# Patient Record
Sex: MALE | Race: WHITE | NOT HISPANIC OR LATINO | Employment: OTHER | URBAN - METROPOLITAN AREA
[De-identification: names, ages, dates, MRNs, and addresses within clinical notes are randomized per-mention and may not be internally consistent; named-entity substitution may affect disease eponyms.]

---

## 2017-02-09 ENCOUNTER — TRANSCRIBE ORDERS (OUTPATIENT)
Dept: LAB | Facility: HOSPITAL | Age: 67
End: 2017-02-09

## 2017-02-09 ENCOUNTER — LAB (OUTPATIENT)
Dept: LAB | Facility: HOSPITAL | Age: 67
End: 2017-02-09

## 2017-02-09 DIAGNOSIS — E05.00 TOXIC DIFFUSE GOITER WITHOUT MENTION OF THYROTOXIC CRISIS OR STORM: ICD-10-CM

## 2017-02-09 DIAGNOSIS — E05.00 TOXIC DIFFUSE GOITER WITHOUT MENTION OF THYROTOXIC CRISIS OR STORM: Primary | ICD-10-CM

## 2017-02-09 LAB
ALBUMIN SERPL-MCNC: 4.4 G/DL (ref 3.2–4.8)
ALBUMIN/GLOB SERPL: 1.4 G/DL (ref 1.5–2.5)
ALP SERPL-CCNC: 104 U/L (ref 25–100)
ALT SERPL W P-5'-P-CCNC: 28 U/L (ref 7–40)
ANION GAP SERPL CALCULATED.3IONS-SCNC: 4 MMOL/L (ref 3–11)
AST SERPL-CCNC: 23 U/L (ref 0–33)
BILIRUB SERPL-MCNC: 0.6 MG/DL (ref 0.3–1.2)
BUN BLD-MCNC: 6 MG/DL (ref 9–23)
BUN/CREAT SERPL: 8.6 (ref 7–25)
CALCIUM SPEC-SCNC: 10.1 MG/DL (ref 8.7–10.4)
CHLORIDE SERPL-SCNC: 99 MMOL/L (ref 99–109)
CO2 SERPL-SCNC: 38 MMOL/L (ref 20–31)
CREAT BLD-MCNC: 0.7 MG/DL (ref 0.6–1.3)
GFR SERPL CREATININE-BSD FRML MDRD: 113 ML/MIN/1.73
GLOBULIN UR ELPH-MCNC: 3.2 GM/DL
GLUCOSE BLD-MCNC: 81 MG/DL (ref 70–100)
POTASSIUM BLD-SCNC: 3.9 MMOL/L (ref 3.5–5.5)
PROT SERPL-MCNC: 7.6 G/DL (ref 5.7–8.2)
SODIUM BLD-SCNC: 141 MMOL/L (ref 132–146)
T3RU NFR SERPL: 31.2 % (ref 23–37)
T4 FREE SERPL-MCNC: 1.14 NG/DL (ref 0.89–1.76)
T4 SERPL-MCNC: 9.1 MCG/DL (ref 4.7–11.4)
TSH SERPL DL<=0.05 MIU/L-ACNC: 1.26 MIU/ML (ref 0.35–5.35)

## 2017-02-09 PROCEDURE — 36415 COLL VENOUS BLD VENIPUNCTURE: CPT | Performed by: INTERNAL MEDICINE

## 2017-02-09 PROCEDURE — 84479 ASSAY OF THYROID (T3 OR T4): CPT | Performed by: INTERNAL MEDICINE

## 2017-02-09 PROCEDURE — 80053 COMPREHEN METABOLIC PANEL: CPT | Performed by: INTERNAL MEDICINE

## 2017-02-09 PROCEDURE — 84481 FREE ASSAY (FT-3): CPT | Performed by: INTERNAL MEDICINE

## 2017-02-09 PROCEDURE — 84439 ASSAY OF FREE THYROXINE: CPT | Performed by: INTERNAL MEDICINE

## 2017-02-09 PROCEDURE — 84443 ASSAY THYROID STIM HORMONE: CPT | Performed by: INTERNAL MEDICINE

## 2017-02-10 LAB — T3FREE SERPL-MCNC: 3.6 PG/ML (ref 2–4.4)

## 2017-04-13 ENCOUNTER — TELEPHONE (OUTPATIENT)
Dept: PAIN MEDICINE | Facility: CLINIC | Age: 67
End: 2017-04-13

## 2017-04-13 NOTE — TELEPHONE ENCOUNTER
Patient underwent bilateral lumbar medial branch rhizotomies L2, L3, L4, L5 on 12/02/2015, and experienced 100% relief of his lower back pain along with remarkable functional improvement. Approximately, two weeks ago, he started experiencing recurrence of his lower back pain, same as before his lumbar RFTC. Pain radiates across his lower back, equal on both sides. Patient denies, pain, numbness or weakness in his lower extremities. Patient would like to repeat lumbar rhizotomies. In addition, he reports complete pain relief of his thoracic spine pain since his thoracic medial branch rhizotomies.   Patient denies other changes in his medical history or any new symptoms. He continues on flexeril and tramadol without significant relief. [facility] report # [id number] appropriate.

## 2017-04-25 ENCOUNTER — OFFICE VISIT (OUTPATIENT)
Dept: PAIN MEDICINE | Facility: CLINIC | Age: 67
End: 2017-04-25

## 2017-04-25 VITALS
DIASTOLIC BLOOD PRESSURE: 70 MMHG | RESPIRATION RATE: 18 BRPM | HEART RATE: 60 BPM | HEIGHT: 67 IN | BODY MASS INDEX: 29.1 KG/M2 | WEIGHT: 185.4 LBS | OXYGEN SATURATION: 96 % | TEMPERATURE: 97.8 F | SYSTOLIC BLOOD PRESSURE: 127 MMHG

## 2017-04-25 DIAGNOSIS — F17.210 CIGARETTE NICOTINE DEPENDENCE WITHOUT COMPLICATION: ICD-10-CM

## 2017-04-25 DIAGNOSIS — M47.814 THORACIC SPONDYLOSIS WITHOUT MYELOPATHY: ICD-10-CM

## 2017-04-25 DIAGNOSIS — Z86.79 HISTORY OF ATRIAL FIBRILLATION: ICD-10-CM

## 2017-04-25 DIAGNOSIS — M48.061 LUMBAR STENOSIS WITHOUT NEUROGENIC CLAUDICATION: ICD-10-CM

## 2017-04-25 DIAGNOSIS — M47.816 SPONDYLOSIS OF LUMBAR REGION WITHOUT MYELOPATHY OR RADICULOPATHY: ICD-10-CM

## 2017-04-25 DIAGNOSIS — M51.36 DDD (DEGENERATIVE DISC DISEASE), LUMBAR: ICD-10-CM

## 2017-04-25 PROBLEM — M51.369 DDD (DEGENERATIVE DISC DISEASE), LUMBAR: Status: ACTIVE | Noted: 2017-04-25

## 2017-04-25 PROCEDURE — 99214 OFFICE O/P EST MOD 30 MIN: CPT | Performed by: ANESTHESIOLOGY

## 2017-04-25 RX ORDER — FLUTICASONE PROPIONATE 50 MCG
SPRAY, SUSPENSION (ML) NASAL
Refills: 0 | COMMUNITY
Start: 2017-04-19

## 2017-04-25 RX ORDER — METHIMAZOLE 10 MG/1
TABLET ORAL
Refills: 6 | COMMUNITY
Start: 2017-04-08

## 2017-04-25 RX ORDER — MAGNESIUM GLUCONATE 27 MG(500)
500 TABLET ORAL DAILY
COMMUNITY
End: 2018-04-26

## 2017-04-25 NOTE — PROGRESS NOTES
"CHIEF COMPLAINT: \"Pain in my lower back.\"    BRIEF HISTORY: Mr. Chirag Abdi is a 67 y.o. male, who returns to the clinic for Evaluation of recurrent lower back pain.  Patient underwent bilateral lumbar medial branch rhizotomies, L2, L3, L4, L5 on December 2, 2015, and experienced complete relief and functional improvement that lasted until one month ago. Pain has progressed in intensity over the past four weeks (same pain as before RFTC). He reports complete pain relief of his thoracic pain since thoracic medial branch rhizotmies.   Current pain level: 5/10  Pain level ranges from 2/10 to 9/10   Patient complains of lower back pain.   Patient complains of constant pain with intermittent exacerbation, described as aching and pressure sensation.   Radiation of pain: does not radiate.  Pain increases with: Pain increases with twisting and bending.   Pain decreases with sitting, lying and heat.   Patient denies  pain, numbness and weakness in the lower extremities. Patient denies  any new bladder or bowel problems.     Review of previous therapies and additional medical records:  Chirag Abdi has already failed the following measures, including:   Conservative measures: oral analgesics, opioids, topical analgesics, massage, physical therapy, ice, heat and chiropractic therapy   Interventional: As referenced under HPI  Surgical: No previous lumbar surgery  Chirag Abdi presents with significant comorbidities including nicotine addiction,  not engaged in treatment., arrhythmia engaged in treatment on Eliquis.  In terms of current analgesics, Chirag Abdi takes: tramadol, Flexeril  I have reviewed her [facility] Report #  [id number] consistent with medication reconciliation.    Diagnostic Studies:  MRI Lumbar Spine w/o Contrast, 07/17/2015: L4-L5, moderate bilateral neuroforaminal stenosis. L5-S1, moderate to severe bilateral neuroforaminal stenosis. No subluxation or acute fractures. No " "abnormal cord signal noted.  X-ray Lumbar 5 views, 06/09/2015: no fracture identified. Mild hypertrophic changes of degenerative disc disease diffusely, alignment is normal.       Review of Systems   Musculoskeletal: Positive for back pain.   All other systems reviewed and are negative.     The following portions of the patient's history were reviewed and updated as appropriate: problem list, past medical history, past surgery history, social history, family history, medications, and allergies     /70  Pulse 60  Temp 97.8 °F (36.6 °C) (Temporal Artery )   Resp 18  Ht 67\" (170.2 cm)  Wt 185 lb 6.4 oz (84.1 kg)  SpO2 96%  BMI 29.04 kg/m2      Physical Exam   Neurologic Exam   General appearance: No acute distress, well appearing and well nourished. Appears healthy, within normal limits of ideal weight, well hydrated and appearance reflects stated age.   Head and Face   Head and face: Normal. Palpation of the face and sinuses: No sinus tenderness.   Eyes   Conjunctiva and lids: No erythema, swelling or discharge. Pupils and irises: Equal, round, reactive to light.   Neck   Neck: Supple, symmetric, trachea midline, no masses.   Pulmonary   Respiratory effort: No increased work of breathing or signs of respiratory distress. Auscultation of lungs: Clear to auscultation.   Cardiovascular   Auscultation of heart: Normal rate and rhythm, normal S1 and S2, no murmurs. Peripheral vascular exam: Normal.   Abdomen   Abdomen: Non-tender, no masses. Bowel sounds were normal. The abdomen was soft and nontender. No masses palpated.   Musculoskeletal   Gait and station: Normal. Gait evaluation demonstrated a normal gait. Thoracic facet joint loading maneuvers are negative. The range of motion of the lumbar spine is limited due to pain. Lumbar facet joint loading maneuvers are positive.  and Gaenslen's tests are negative. The range of motion of the hip joints is full and without pain. Piriformis maneuvers are " negative. Muscle strength/tone: Normal. Motor Strength Findings: normal strength. Motor Tone: normal tone. Involuntary movements: none.   Skin   Skin and subcutaneous tissue: Normal without rashes or lesions.   Neurologic   Cranial nerves: Cranial nerves 2-12 intact.   Cortical function: Normal mental status.   Reflexes: 2+ and symmetric. Deep tendon reflexes: 2+ right biceps, 2+ left biceps, 2+ right patella, 2+ left patella, 2+ right ankle jerk and 2+ left ankle jerk. Superficial/Primitive Reflexes: primitive reflexes were absent. Straight leg raising test is negative. Femoral stretch sign is negative. Sensation: No sensory loss. Sensory exam: intact to light touch, intact pain and temperature sensation, intact vibration sensation and normal proprioception.   Coordination: Normal finger to nose and heel to shin. Coordination: normal balance and negative Romberg's sign.   Psychiatric   Judgment and insight: Normal. Orientation to person, place and time: Normal. Recent and remote memory: Intact. Mood and affect: Normal.      ASSESSMENT:   1. Spondylosis of lumbar region without myelopathy or radiculopathy    2. DDD (degenerative disc disease), lumbar    3. Lumbar stenosis without neurogenic claudication    4. Thoracic spondylosis without myelopathy    5. History of atrial fibrillation    6. Cigarette nicotine dependence without complication      PLAN: I have reviewed all available patient's medical records as well as previous therapies as referenced above under history of present illness. Patient Did exceedingly well after lumbar medial branch rhizotomies performed in December 2015.  Therefore, have proposed the following plan:  1. Patient will be scheduled for one set of diagnostic bilateral lumbar medial branch blocks at L2, L3, L4, L5; for bilateral lumbar facet joints at L3-L4, L4-L5, and L5-S1.  If patient experiences more than 80% pain relief along with significant improvement in the range of motion of the  lumbar spine, then, we will repeat lumbar medial branch rhizotomies.  2. Long-term rehabilitation efforts:  a. Patient has declined a referral to [facility]  b. Patient will start physical therapy after his lumbar RFTC.   c. Continue TENS unit  d. Contrast therapy: Apply ice packs to the affected area for 15-20 minutes, then, heating pads for 15-20 minutes, then, use TENS unit. Repeat every 4-6 hours as necessary  3. Pharmacological measures, as follows;  a. Continue Lidoderm patches  b. Take Tylenol 500 mg four times daily as needed for mild to moderate breakthrough pain  c. Continue Flexeril and tramadol, as currently prescribed  4. The patient has been instructed to contact my office with any questions or difficulties. The patient understands the plan and agrees to proceed accordingly.        Patient Care Team:  René Turcios MD as PCP - General (Family Medicine)  Orlando Munroe MD as Consulting Physician (Pain Medicine)  Pierre Villarreal MD as Consulting Physician (Endocrinology)  Jabari Loo MD as Surgeon (Neurosurgery)     New Medications Ordered This Visit   Medications   • magnesium gluconate (MAGONATE) 500 MG tablet     Sig: Take 500 mg by mouth Daily.   • B Complex Vitamins (VITAMIN B COMPLEX PO)     Sig: Take  by mouth Daily.         No future appointments.      Orlando Munroe MD       EMR Dragon/Transcription disclaimer:  Much of this encounter note is an electronic transcription of spoken language to printed text. Electronic transcription of spoken language may permit erroneous, or at times, nonsensical words or phrases to be inadvertently transcribed. Although I have reviewed the note for such errors, some may still exist.

## 2017-04-26 ENCOUNTER — OUTSIDE FACILITY SERVICE (OUTPATIENT)
Dept: PAIN MEDICINE | Facility: CLINIC | Age: 67
End: 2017-04-26

## 2017-04-26 PROCEDURE — 99152 MOD SED SAME PHYS/QHP 5/>YRS: CPT | Performed by: ANESTHESIOLOGY

## 2017-04-26 PROCEDURE — 64493 INJ PARAVERT F JNT L/S 1 LEV: CPT | Performed by: ANESTHESIOLOGY

## 2017-04-26 PROCEDURE — 64495 INJ PARAVERT F JNT L/S 3 LEV: CPT | Performed by: ANESTHESIOLOGY

## 2017-04-26 PROCEDURE — 64494 INJ PARAVERT F JNT L/S 2 LEV: CPT | Performed by: ANESTHESIOLOGY

## 2017-05-11 ENCOUNTER — LAB (OUTPATIENT)
Dept: LAB | Facility: HOSPITAL | Age: 67
End: 2017-05-11

## 2017-05-11 ENCOUNTER — TRANSCRIBE ORDERS (OUTPATIENT)
Dept: LAB | Facility: HOSPITAL | Age: 67
End: 2017-05-11

## 2017-05-11 DIAGNOSIS — E05.00 TOXIC DIFFUSE GOITER WITHOUT MENTION OF THYROTOXIC CRISIS OR STORM: ICD-10-CM

## 2017-05-11 DIAGNOSIS — E05.00 TOXIC DIFFUSE GOITER WITHOUT MENTION OF THYROTOXIC CRISIS OR STORM: Primary | ICD-10-CM

## 2017-05-11 LAB
T3RU NFR SERPL: 30.4 % (ref 23–37)
T4 FREE SERPL-MCNC: 1.01 NG/DL (ref 0.89–1.76)
T4 SERPL-MCNC: 6.4 MCG/DL (ref 4.7–11.4)
TSH SERPL DL<=0.05 MIU/L-ACNC: 1.43 MIU/ML (ref 0.35–5.35)

## 2017-05-11 PROCEDURE — 84479 ASSAY OF THYROID (T3 OR T4): CPT | Performed by: INTERNAL MEDICINE

## 2017-05-11 PROCEDURE — 84443 ASSAY THYROID STIM HORMONE: CPT | Performed by: INTERNAL MEDICINE

## 2017-05-11 PROCEDURE — 36415 COLL VENOUS BLD VENIPUNCTURE: CPT

## 2017-05-11 PROCEDURE — 84439 ASSAY OF FREE THYROXINE: CPT | Performed by: INTERNAL MEDICINE

## 2017-08-15 ENCOUNTER — TELEPHONE (OUTPATIENT)
Dept: PAIN MEDICINE | Facility: CLINIC | Age: 67
End: 2017-08-15

## 2017-08-17 ENCOUNTER — LAB (OUTPATIENT)
Dept: LAB | Facility: HOSPITAL | Age: 67
End: 2017-08-17

## 2017-08-17 ENCOUNTER — OFFICE VISIT (OUTPATIENT)
Dept: PAIN MEDICINE | Facility: CLINIC | Age: 67
End: 2017-08-17

## 2017-08-17 ENCOUNTER — TRANSCRIBE ORDERS (OUTPATIENT)
Dept: LAB | Facility: HOSPITAL | Age: 67
End: 2017-08-17

## 2017-08-17 VITALS
TEMPERATURE: 97.6 F | BODY MASS INDEX: 28.56 KG/M2 | HEIGHT: 67 IN | WEIGHT: 182 LBS | HEART RATE: 61 BPM | OXYGEN SATURATION: 94 % | SYSTOLIC BLOOD PRESSURE: 120 MMHG | DIASTOLIC BLOOD PRESSURE: 68 MMHG

## 2017-08-17 DIAGNOSIS — M47.814 THORACIC SPONDYLOSIS WITHOUT MYELOPATHY: ICD-10-CM

## 2017-08-17 DIAGNOSIS — M48.061 LUMBAR STENOSIS WITHOUT NEUROGENIC CLAUDICATION: ICD-10-CM

## 2017-08-17 DIAGNOSIS — M47.816 SPONDYLOSIS OF LUMBAR REGION WITHOUT MYELOPATHY OR RADICULOPATHY: ICD-10-CM

## 2017-08-17 DIAGNOSIS — Z86.79 HISTORY OF ATRIAL FIBRILLATION: ICD-10-CM

## 2017-08-17 DIAGNOSIS — M51.36 DDD (DEGENERATIVE DISC DISEASE), LUMBAR: ICD-10-CM

## 2017-08-17 DIAGNOSIS — E05.90 PRETIBIAL MYXEDEMA: ICD-10-CM

## 2017-08-17 DIAGNOSIS — E05.00 TOXIC DIFFUSE GOITER WITHOUT MENTION OF THYROTOXIC CRISIS OR STORM: ICD-10-CM

## 2017-08-17 DIAGNOSIS — F17.210 CIGARETTE NICOTINE DEPENDENCE WITHOUT COMPLICATION: ICD-10-CM

## 2017-08-17 DIAGNOSIS — E05.00 TOXIC DIFFUSE GOITER WITHOUT MENTION OF THYROTOXIC CRISIS OR STORM: Primary | ICD-10-CM

## 2017-08-17 LAB
CA-I SERPL ISE-MCNC: 1.19 MMOL/L (ref 1.12–1.32)
T3RU NFR SERPL: 34.3 % (ref 23–37)
T4 FREE SERPL-MCNC: 1.08 NG/DL (ref 0.89–1.76)
T4 SERPL-MCNC: 7.2 MCG/DL (ref 4.7–11.4)
TSH SERPL DL<=0.05 MIU/L-ACNC: 1.56 MIU/ML (ref 0.35–5.35)

## 2017-08-17 PROCEDURE — 82330 ASSAY OF CALCIUM: CPT | Performed by: INTERNAL MEDICINE

## 2017-08-17 PROCEDURE — 36415 COLL VENOUS BLD VENIPUNCTURE: CPT

## 2017-08-17 PROCEDURE — 99213 OFFICE O/P EST LOW 20 MIN: CPT | Performed by: ANESTHESIOLOGY

## 2017-08-17 PROCEDURE — 84443 ASSAY THYROID STIM HORMONE: CPT | Performed by: INTERNAL MEDICINE

## 2017-08-17 PROCEDURE — 84479 ASSAY OF THYROID (T3 OR T4): CPT | Performed by: INTERNAL MEDICINE

## 2017-08-17 PROCEDURE — 84439 ASSAY OF FREE THYROXINE: CPT | Performed by: INTERNAL MEDICINE

## 2017-08-17 RX ORDER — LIDOCAINE 50 MG/G
1 PATCH TOPICAL EVERY 12 HOURS
Qty: 60 PATCH | Refills: 3 | Status: SHIPPED | OUTPATIENT
Start: 2017-08-17 | End: 2018-04-26

## 2017-08-17 RX ORDER — ACETAMINOPHEN 500 MG
TABLET ORAL
Qty: 120 TABLET | Refills: 6 | Status: SHIPPED | OUTPATIENT
Start: 2017-08-17 | End: 2018-04-26

## 2017-08-17 NOTE — PROGRESS NOTES
"CHIEF COMPLAINT: \"Pain in my lower back. I did great after my diagnostic blocks. I had to cancel my rhizotomy because my wife got sick.\"    BRIEF HISTORY: Mr. Chirag Abdi is a 67 y.o. male, who returns to the clinic for evaluation of his chronic lower back pain and to schedule lumbar medial branch rhizotomies. Patient underwent bilateral lumbar medial branch blocks L2, L3, L4, L5 on 04/26/2017, and experienced 100% relief of his lower back pain along with remarkable functional improvement for three days. He had to cancel his RFTC due to his wife's sickness. He is experiencing same pain as before his lumbar RFTC on 12/02/2015. Patient underwent bilateral lumbar medial branch rhizotomies, L2, L3, L4, L5 on December 2, 2015, and experienced complete relief and functional improvement that lasted until three months ago.   Current pain level: 6/10  Pain level ranges from 2/10 to 9/10   Patient complains of lower back pain.   Patient complains of constant pain with intermittent exacerbation, described as aching and pressure sensation.   Radiation of pain: does not radiate.  Pain increases with: Pain increases with twisting and bending.   Pain decreases with sitting, lying and heat.   Patient denies  pain, numbness and weakness in the lower extremities. Patient denies  any new bladder or bowel problems.     Review of previous therapies and additional medical records:  Chirag Abdi has already failed the following measures, including:   Conservative measures: oral analgesics, opioids, topical analgesics, massage, physical therapy, ice, heat and chiropractic therapy   Interventional: As referenced under HPI  Surgical: No previous lumbar surgery  Chirag Abdi presents with significant comorbidities including nicotine addiction,  not engaged in treatment., arrhythmia engaged in treatment on Eliquis.  In terms of current analgesics, Chirag Abdi takes: tramadol, Flexeril  I have reviewed her " "[facility] Report # [id number] consistent with medication reconciliation.    Diagnostic Studies:  MRI Lumbar Spine w/o Contrast, 07/17/2015: L4-L5, moderate bilateral neuroforaminal stenosis. L5-S1, moderate to severe bilateral neuroforaminal stenosis. No subluxation or acute fractures. No abnormal cord signal noted.  X-ray Lumbar 5 views, 06/09/2015: no fracture identified. Mild hypertrophic changes of degenerative disc disease diffusely, alignment is normal.       Review of Systems   Musculoskeletal: Positive for arthralgias and back pain.   All other systems reviewed and are negative.     The following portions of the patient's history were reviewed and updated as appropriate: problem list, past medical history, past surgery history, social history, family history, medications, and allergies     /68 (BP Location: Right arm, Patient Position: Sitting)  Pulse 61  Temp 97.6 °F (36.4 °C) (Temporal Artery )   Ht 67\" (170.2 cm)  Wt 182 lb (82.6 kg)  SpO2 94%  BMI 28.51 kg/m2      Physical Exam   Neurologic Exam   General appearance: No acute distress, well appearing and well nourished. Appears healthy, within normal limits of ideal weight, well hydrated and appearance reflects stated age.   Head and Face   Head and face: Normal. Palpation of the face and sinuses: No sinus tenderness.   Eyes   Conjunctiva and lids: No erythema, swelling or discharge. Pupils and irises: Equal, round, reactive to light.   Neck   Neck: Supple, symmetric, trachea midline, no masses.   Pulmonary   Respiratory effort: No increased work of breathing or signs of respiratory distress. Auscultation of lungs: Clear to auscultation.   Cardiovascular   Auscultation of heart: Normal rate and rhythm, normal S1 and S2, no murmurs. Peripheral vascular exam: Normal.   Abdomen   Abdomen: Non-tender, no masses. Bowel sounds were normal. The abdomen was soft and nontender. No masses palpated.   Musculoskeletal   Gait and station: Normal. Gait " evaluation demonstrated a normal gait. Thoracic facet joint loading maneuvers are negative. The range of motion of the lumbar spine is limited due to pain. Lumbar facet joint loading maneuvers are positive.  and Gaenslen's tests are negative. The range of motion of the hip joints is full and without pain. Piriformis maneuvers are negative. Muscle strength/tone: Normal. Motor Strength Findings: normal strength. Motor Tone: normal tone. Involuntary movements: none.   Skin   Skin and subcutaneous tissue: Normal without rashes or lesions.   Neurologic   Cranial nerves: Cranial nerves 2-12 intact.   Cortical function: Normal mental status.   Reflexes: 2+ and symmetric. Deep tendon reflexes: 2+ right biceps, 2+ left biceps, 2+ right patella, 2+ left patella, 2+ right ankle jerk and 2+ left ankle jerk. Superficial/Primitive Reflexes: primitive reflexes were absent. Straight leg raising test is negative. Femoral stretch sign is negative. Sensation: No sensory loss. Sensory exam: intact to light touch, intact pain and temperature sensation, intact vibration sensation and normal proprioception.   Coordination: Normal finger to nose and heel to shin. Coordination: normal balance and negative Romberg's sign.   Psychiatric   Judgment and insight: Normal. Orientation to person, place and time: Normal. Recent and remote memory: Intact. Mood and affect: Normal.      ASSESSMENT:   1. Spondylosis of lumbar region without myelopathy or radiculopathy    2. DDD (degenerative disc disease), lumbar    3. Lumbar stenosis without neurogenic claudication    4. Thoracic spondylosis without myelopathy    5. History of atrial fibrillation    6. Cigarette nicotine dependence without complication      PLAN: I have reviewed all available patient's medical records as well as previous therapies as referenced above under history of present illness. Patient did exceedingly well after lumbar medial branch rhizotomies performed in December 2015,  and again after one set of diagnostic lumbar medial branch blocks, as referenced under HPI.  Therefore, have proposed the following plan:  1. Patient will be scheduled for bilateral lumbar medial branch rhizotomies at L2, L3, L4, L5; for bilateral lumbar facet joints at L3-L4, L4-L5, and L5-S1.    2. Long-term rehabilitation efforts:  a. Patient has declined a referral to [facility] Cessation [facility]  b. Patient will start physical therapy after his lumbar RFTC.   c. Continue TENS unit  d. Contrast therapy: Apply ice packs to the affected area for 15-20 minutes, then, heating pads for 15-20 minutes, then, use TENS unit. Repeat every 4-6 hours as necessary  3. Pharmacological measures, as follows;  a. Continue Lidoderm patches  b. Take Tylenol 500 mg four times daily as needed for mild to moderate breakthrough pain  c. Continue Flexeril and tramadol, as currently prescribed  4. The patient has been instructed to contact my office with any questions or difficulties. The patient understands the plan and agrees to proceed accordingly.        Patient Care Team:  René Turcios MD as PCP - General (Family Medicine)  Orlando Munroe MD as Consulting Physician (Pain Medicine)  Pierre Villarreal MD as Consulting Physician (Endocrinology)  Jabari Loo MD as Surgeon (Neurosurgery)     No orders of the defined types were placed in this encounter.        Future Appointments  Date Time Provider Department Center   8/17/2017 1:00 PM Orlando Munroe MD [facility] None         Orlando Munroe MD       EMR Dragon/Transcription disclaimer:  Much of this encounter note is an electronic transcription of spoken language to printed text. Electronic transcription of spoken language may permit erroneous, or at times, nonsensical words or phrases to be inadvertently transcribed. Although I have reviewed the note for such errors, some may still exist.

## 2017-09-06 ENCOUNTER — OUTSIDE FACILITY SERVICE (OUTPATIENT)
Dept: PAIN MEDICINE | Facility: CLINIC | Age: 67
End: 2017-09-06

## 2017-09-06 PROCEDURE — 64635 DESTROY LUMB/SAC FACET JNT: CPT | Performed by: ANESTHESIOLOGY

## 2017-09-06 PROCEDURE — 99152 MOD SED SAME PHYS/QHP 5/>YRS: CPT | Performed by: ANESTHESIOLOGY

## 2017-09-06 PROCEDURE — 64636 DESTROY L/S FACET JNT ADDL: CPT | Performed by: ANESTHESIOLOGY

## 2018-04-23 ENCOUNTER — TELEPHONE (OUTPATIENT)
Dept: PAIN MEDICINE | Facility: CLINIC | Age: 68
End: 2018-04-23

## 2018-04-25 NOTE — PROGRESS NOTES
"CHIEF COMPLAINT: \"Pain in my thoracic spine.\"    BRIEF HISTORY: Mr. Chirag Abdi is a 68 y.o. male, who returns to the clinic for evaluation of recurrent thoracic spine pain. Patient underwent bilateral thoracic medial branch rhizotomies T8, T9, T10 for bilateral thoracic facet joints at T9-T10, T10-T11 on 06/15/2016. Patient reports that he experienced complete pain relief and functional improvement that lasted until this past month. Then, pain progressively recurred.   Patient complains of constant thoracic spine pain with intermittent exacerbation, described as aching and pressure sensation.   Radiation of pain: does not radiate.  Current pain level: 8/10  Pain level ranges from 5/10 to 9/10   Pain increases by trying to stand up straight.   Pain decreases with sitting, lying down and heat.   Patient denies pain, numbness or weakness in the lower extremities.   Patient denies any new bladder or bowel problems  Patient has used heat, Flexeril and TENS unit with small amount of relief.     Review of previous therapies and additional medical records:  Chirag Abdi has already failed the following measures, including:   Conservative measures: oral analgesics, opioids, topical analgesics, massage, physical therapy, ice, heat and chiropractic therapy   Interventional:   Bilateral lumbar medial branch rhizotomies: RFTC on 12/02/2015, and 09/06/2017. Patient experienced complete ongoing pain relief and functional improvement. And as referenced under HPI.  Surgical: No history of lumbar surgery  Chirag Abdi presents with significant comorbidities including nicotine addiction, not engaged in treatment, arrhythmia engaged in treatment on Eliquis.  In terms of current analgesics, Chirag Abdi takes: Flexeril  I have reviewed her [facility] Report #[id number] consistent with medication reconciliation.    Diagnostic Studies:  MRI Lumbar Spine w/o Contrast, 07/17/2015: L4-L5, moderate bilateral " "neuroforaminal stenosis. L5-S1, moderate to severe bilateral neuroforaminal stenosis. No subluxation or acute fractures. No abnormal cord signal noted.  X-ray Lumbar 5 views, 06/09/2015: no fracture identified. Mild hypertrophic changes of degenerative disc disease diffusely, alignment is normal.       Review of Systems   HENT: Positive for postnasal drip.    Musculoskeletal: Positive for arthralgias, back pain and neck pain.   All other systems reviewed and are negative.     The following portions of the patient's history were reviewed and updated as appropriate: problem list, past medical history, past surgery history, social history, family history, medications, and allergies     /70 (BP Location: Right arm)   Pulse 72   Temp 98.1 °F (36.7 °C) (Temporal Artery )   Resp 18   Ht 170.2 cm (67\")   Wt 86.8 kg (191 lb 6.4 oz)   SpO2 97%   BMI 29.98 kg/m²       Physical Exam   Neurologic Exam   General appearance: No acute distress, well appearing and well nourished. Appears healthy, within normal limits of ideal weight, well hydrated and appearance reflects stated age.   Head and Face   Head and face: Normal. Palpation of the face and sinuses: No sinus tenderness.   Eyes   Conjunctiva and lids: No erythema, swelling or discharge. Pupils and irises: Equal, round, reactive to light.   Neck   Neck: Supple, symmetric, trachea midline, no masses.   Pulmonary   Respiratory effort: No increased work of breathing or signs of respiratory distress. Auscultation of lungs: Clear to auscultation.   Cardiovascular   Auscultation of heart: Normal rate and rhythm, normal S1 and S2, no murmurs. Peripheral vascular exam: Normal.   Abdomen   Abdomen: Non-tender, no masses. Bowel sounds were normal. The abdomen was soft and nontender. No masses palpated.   Musculoskeletal   Gait and station: Normal. Gait evaluation demonstrated a normal gait. Thoracic facet joint loading maneuvers are positive. The range of motion of the " lumbar spine is improved and without pain. Lumbar facet joint loading maneuvers are negative.  and Gaenslen's tests are negative. The range of motion of the hip joints is full and without pain. Piriformis maneuvers are negative. Muscle strength/tone: Normal. Motor Strength Findings: normal strength. Motor Tone: normal tone. Involuntary movements: none.   Skin   Skin and subcutaneous tissue: Normal without rashes or lesions.   Neurologic   Cranial nerves: Cranial nerves 2-12 intact.   Cortical function: Normal mental status.   Reflexes: 2+ and symmetric. Deep tendon reflexes: 2+ right biceps, 2+ left biceps, 2+ right patella, 2+ left patella, 2+ right ankle jerk and 2+ left ankle jerk. Superficial/Primitive Reflexes: primitive reflexes were absent. Straight leg raising test is negative. Femoral stretch sign is negative. Sensation: No sensory loss. Sensory exam: intact to light touch, intact pain and temperature sensation, intact vibration sensation and normal proprioception.   Coordination: Normal finger to nose and heel to shin. Coordination: normal balance and negative Romberg's sign.   Psychiatric   Judgment and insight: Normal. Orientation to person, place and time: Normal. Recent and remote memory: Intact. Mood and affect: Normal.      ASSESSMENT:   1. Thoracic spondylosis without myelopathy    2. Spondylosis of lumbar region without myelopathy or radiculopathy    3. DDD (degenerative disc disease), lumbar    4. Lumbar stenosis without neurogenic claudication    5. History of atrial fibrillation    6. Cigarette nicotine dependence without complication       PLAN: I have reviewed all available patient's medical records as well as previous therapies as referenced above under history of present illness. Patient did exceedingly well after thoracic and lumbar medial branch rhizotomies, as referenced under HPI.  Therefore, have proposed the following plan:  1. Patient will be scheduled for one set of diagnostic  bilateral thoracic medial branch blocks at T8, T9, T10 for bilateral thoracic facet joints at T9-T10, T10-T11.If patient experiences more than 80% relief and functional improvement, then, will repeat thoracic medial branch rhizotomies   2. Long-term rehabilitation efforts:  a. Patient has declined counseling for smoking cessation   b. Patient will start physical therapy after his RFTC.   c. Continue TENS unit  d. Contrast therapy: Apply ice packs to the affected area for 15-20 minutes, then, heating pads for 15-20 minutes, then, use TENS unit. Repeat every 4-6 hours as necessary  3. Pharmacological measures, as follows;  a. Continue Lidoderm patches  b. Take Tylenol 500 mg four times daily as needed for mild to moderate breakthrough pain  c. Continue Flexeril, as currently prescribed  4. The patient has been instructed to contact my office with any questions or difficulties. The patient understands the plan and agrees to proceed accordingly.      Patient Care Team:  René Turcios MD as PCP - General (Family Medicine)  Orlando Munroe MD as Consulting Physician (Pain Medicine)  Pierre Villarreal MD as Consulting Physician (Endocrinology)  Jabari Loo MD as Consulting Physician (Neurosurgery)     No orders of the defined types were placed in this encounter.        No future appointments.      Orlando Munroe MD       EMR Dragon/Transcription disclaimer:  Much of this encounter note is an electronic transcription of spoken language to printed text. Electronic transcription of spoken language may permit erroneous, or at times, nonsensical words or phrases to be inadvertently transcribed. Although I have reviewed the note for such errors, some may still exist.

## 2018-04-26 ENCOUNTER — OFFICE VISIT (OUTPATIENT)
Dept: PAIN MEDICINE | Facility: CLINIC | Age: 68
End: 2018-04-26

## 2018-04-26 VITALS
BODY MASS INDEX: 30.04 KG/M2 | HEIGHT: 67 IN | DIASTOLIC BLOOD PRESSURE: 70 MMHG | HEART RATE: 72 BPM | RESPIRATION RATE: 18 BRPM | WEIGHT: 191.4 LBS | SYSTOLIC BLOOD PRESSURE: 130 MMHG | TEMPERATURE: 98.1 F | OXYGEN SATURATION: 97 %

## 2018-04-26 DIAGNOSIS — Z86.79 HISTORY OF ATRIAL FIBRILLATION: ICD-10-CM

## 2018-04-26 DIAGNOSIS — M47.814 THORACIC SPONDYLOSIS WITHOUT MYELOPATHY: ICD-10-CM

## 2018-04-26 DIAGNOSIS — M48.061 LUMBAR STENOSIS WITHOUT NEUROGENIC CLAUDICATION: ICD-10-CM

## 2018-04-26 DIAGNOSIS — M47.816 SPONDYLOSIS OF LUMBAR REGION WITHOUT MYELOPATHY OR RADICULOPATHY: ICD-10-CM

## 2018-04-26 DIAGNOSIS — F17.210 CIGARETTE NICOTINE DEPENDENCE WITHOUT COMPLICATION: ICD-10-CM

## 2018-04-26 DIAGNOSIS — M51.36 DDD (DEGENERATIVE DISC DISEASE), LUMBAR: ICD-10-CM

## 2018-04-26 PROCEDURE — 99213 OFFICE O/P EST LOW 20 MIN: CPT | Performed by: ANESTHESIOLOGY

## 2018-05-09 ENCOUNTER — OUTSIDE FACILITY SERVICE (OUTPATIENT)
Dept: PAIN MEDICINE | Facility: CLINIC | Age: 68
End: 2018-05-09

## 2018-05-09 PROCEDURE — 64491 INJ PARAVERT F JNT C/T 2 LEV: CPT | Performed by: ANESTHESIOLOGY

## 2018-05-09 PROCEDURE — 64490 INJ PARAVERT F JNT C/T 1 LEV: CPT | Performed by: ANESTHESIOLOGY

## 2018-05-09 PROCEDURE — 99152 MOD SED SAME PHYS/QHP 5/>YRS: CPT | Performed by: ANESTHESIOLOGY

## 2018-05-23 ENCOUNTER — OUTSIDE FACILITY SERVICE (OUTPATIENT)
Dept: PAIN MEDICINE | Facility: CLINIC | Age: 68
End: 2018-05-23

## 2018-05-23 PROCEDURE — 64634 DESTROY C/TH FACET JNT ADDL: CPT | Performed by: ANESTHESIOLOGY

## 2018-05-23 PROCEDURE — 64633 DESTROY CERV/THOR FACET JNT: CPT | Performed by: ANESTHESIOLOGY

## 2018-05-23 PROCEDURE — 99152 MOD SED SAME PHYS/QHP 5/>YRS: CPT | Performed by: ANESTHESIOLOGY

## 2018-07-25 RX ORDER — GABAPENTIN 100 MG/1
CAPSULE ORAL
Qty: 120 CAPSULE | Refills: 1 | Status: SHIPPED | OUTPATIENT
Start: 2018-07-25 | End: 2018-10-15 | Stop reason: SDUPTHER

## 2018-08-06 ENCOUNTER — TELEPHONE (OUTPATIENT)
Dept: PAIN MEDICINE | Facility: CLINIC | Age: 68
End: 2018-08-06

## 2018-08-06 NOTE — TELEPHONE ENCOUNTER
Patient called to report gabapentin was very effective at this time and that he didn't need f/u for now

## 2018-10-10 RX ORDER — GABAPENTIN 100 MG/1
CAPSULE ORAL
Qty: 120 CAPSULE | Refills: 1 | OUTPATIENT
Start: 2018-10-10

## 2018-10-10 NOTE — TELEPHONE ENCOUNTER
Carley scheduled patient appointment     ----- Message from Orlando Munroe MD sent at 10/10/2018 10:06 AM EDT -----  Regarding: RE: Rx  Make him an appt with jama for refill ASAP  ----- Message -----  From: Yoly Jeffries MA  Sent: 10/10/2018   9:11 AM  To: Orlando Munroe MD  Subject: Rx                                               Received refill request for Gabapentin. Patient last seen 5/23/18. Manny report # [id number] scanned to media

## 2018-10-12 NOTE — PROGRESS NOTES
"CHIEF COMPLAINT: \"I need a refill on gabapentin.\"    BRIEF HISTORY: Mr. Chirag Abdi is a 68 y.o. male, who returns to the clinic for medication refill. Patient takes gabapentin 100 mg qid, without adverse effects.  He was last seen on 05/23/2018 for bilateral rhizotomies at T8, T9, and T10, and experienced ongoing relief.  He states that he has some soreness in his right hip and buttock after a recent fall.  Patient states that x-rays of hip were normal, and he was diagnosed with \"a deep tissue bruise.\" Overall patient states that he is doing well, and voices no concerns.  Current pain level: 3/10  Pain level ranges from 3/10 to 7/10   Pain increases by trying to stand up straight.   Pain decreases with sitting, lying down and heat.   Patient denies pain, numbness or weakness in the lower extremities.   Patient denies any new bladder or bowel problems  Patient uses heat, tizanidine, and TENS unit.     Review of previous therapies and additional medical records:  Chirag Abdi has already failed the following measures, including:   Conservative measures: oral analgesics, opioids, topical analgesics, massage, physical therapy, ice, heat and chiropractic therapy   Interventional:   Bilateral lumbar medial branch rhizotomies: As referenced above.  RFTC on 12/02/2015, and 09/06/2017.    Surgical: No history of lumbar surgery  Chirag Abdi presents with significant comorbidities including nicotine addiction, not engaged in treatment, arrhythmia engaged in treatment on Eliquis.  In terms of current analgesics, Chirag Abdi takes: tizanidine and gabapentin  I have reviewed her Manny Report #[id number] consistent with medication reconciliation.    Global Pain Scale 10-          Pain 11          Feelings 0          Clinical outcomes 9          Activities 17          GPS Total: 37            Diagnostic Studies: There are no new studies available for review.      Review of Systems " "  Musculoskeletal: Positive for arthralgias, back pain, gait problem and myalgias.   All other systems reviewed and are negative.     The following portions of the patient's history were reviewed and updated as appropriate: problem list, past medical history, past surgery history, social history, family history, medications, and allergies     /60   Pulse 64   Temp 97.1 °F (36.2 °C) (Temporal Artery )   Resp 18   Ht 170.2 cm (67\")   Wt 89.9 kg (198 lb 3.2 oz)   SpO2 94%   BMI 31.04 kg/m²       Physical Exam   Neurologic Exam   General appearance: No acute distress, well appearing and well nourished. Appears healthy, within normal limits of ideal weight, well hydrated and appearance reflects stated age.   Head and Face   Head and face: Normal.   Eyes   Conjunctiva and lids: No erythema, swelling or discharge. Pupils and irises: Equal, round, reactive to light.   Neck   Neck: Supple, symmetric, trachea midline, no masses.   Pulmonary   Respiratory effort: No increased work of breathing or signs of respiratory distress. Auscultation of lungs: Clear to auscultation.   Cardiovascular   Auscultation of heart: Normal rate and rhythm, normal S1 and S2, no murmurs. Peripheral vascular exam: Normal.   Abdomen   Abdomen: Non-tender, no masses. Bowel sounds were normal. The abdomen was soft and nontender. No masses palpated.   Musculoskeletal   Gait and station: Normal. Gait evaluation demonstrated a normal gait. Thoracic facet joint loading maneuvers are negative. The range of motion of the lumbar spine is almost full and without pain. Lumbar facet joint loading maneuvers are negative.  and Gaenslen's tests are negative. The range of motion of the hip joints is full and without pain. Piriformis maneuvers are negative. Muscle strength/tone: Normal. Motor Strength Findings: normal strength. Motor Tone: normal tone. Involuntary movements: none.   Skin   Skin and subcutaneous tissue: Normal without rashes or " lesions.   Neurologic   Cranial nerves: Cranial nerves 2-12 intact.   Cortical function: Normal mental status.   Reflexes: 2+ and symmetric. Deep tendon reflexes: 2+ right biceps, 2+ left biceps, 2+ right patella, 2+ left patella, 2+ right ankle jerk and 2+ left ankle jerk. Superficial/Primitive Reflexes: primitive reflexes were absent. Straight leg raising test is negative. Femoral stretch sign is negative. Sensation: No sensory loss. Sensory exam: intact to light touch, intact pain and temperature sensation, intact vibration sensation and normal proprioception.   Coordination: Normal finger to nose and heel to shin. Coordination: normal balance and negative Romberg's sign.   Psychiatric   Judgment and insight: Normal. Orientation to person, place and time: Normal. Recent and remote memory: Intact. Mood and affect: Normal.      ASSESSMENT:   1. Thoracic spondylosis without myelopathy    2. Spondylosis of lumbar region without myelopathy or radiculopathy    3. DDD (degenerative disc disease), lumbar    4. Lumbar stenosis without neurogenic claudication       PLAN:   I have reviewed all available patient's medical records as well as previous therapies as referenced above, and discussed the patient with Dr. Munroe.  1. Continue gabapentin 100 mg qid.  Refilled.  RTC in 6 months for medication follow-up.  2. Take Tylenol 500 mg four times daily as needed for mild to moderate breakthrough pain  3. Continue tizanidine, as currently prescribed  4. The patient has been instructed to contact my office with any questions or difficulties. The patient understands the plan and agrees to proceed accordingly.      Patient Care Team:  René Turcios MD as PCP - General (Family Medicine)  Orlando Munroe MD as Consulting Physician (Pain Medicine)  Pierre Villarreal MD as Consulting Physician (Endocrinology)  Jabari Loo MD as Consulting Physician (Neurosurgery)     No orders of the defined types were placed  in this encounter.        Future Appointments  Date Time Provider Department Center   3/5/2019 10:30 AM Benoit Gomez PA-C [facility] None         Benoit Gomez PA-C       EMR Dragon/Transcription disclaimer:  Much of this encounter note is an electronic transcription of spoken language to printed text. Electronic transcription of spoken language may permit erroneous, or at times, nonsensical words or phrases to be inadvertently transcribed. Although I have reviewed the note for such errors, some may still exist.

## 2018-10-15 ENCOUNTER — OFFICE VISIT (OUTPATIENT)
Dept: PAIN MEDICINE | Facility: CLINIC | Age: 68
End: 2018-10-15

## 2018-10-15 VITALS
RESPIRATION RATE: 18 BRPM | HEIGHT: 67 IN | OXYGEN SATURATION: 94 % | TEMPERATURE: 97.1 F | DIASTOLIC BLOOD PRESSURE: 60 MMHG | SYSTOLIC BLOOD PRESSURE: 140 MMHG | HEART RATE: 64 BPM | BODY MASS INDEX: 31.11 KG/M2 | WEIGHT: 198.2 LBS

## 2018-10-15 DIAGNOSIS — M48.061 LUMBAR STENOSIS WITHOUT NEUROGENIC CLAUDICATION: ICD-10-CM

## 2018-10-15 DIAGNOSIS — M47.814 THORACIC SPONDYLOSIS WITHOUT MYELOPATHY: Primary | ICD-10-CM

## 2018-10-15 DIAGNOSIS — M47.816 SPONDYLOSIS OF LUMBAR REGION WITHOUT MYELOPATHY OR RADICULOPATHY: ICD-10-CM

## 2018-10-15 DIAGNOSIS — M51.36 DDD (DEGENERATIVE DISC DISEASE), LUMBAR: ICD-10-CM

## 2018-10-15 PROCEDURE — 99213 OFFICE O/P EST LOW 20 MIN: CPT | Performed by: PHYSICIAN ASSISTANT

## 2018-10-15 RX ORDER — TIZANIDINE HYDROCHLORIDE 2 MG/1
2 CAPSULE, GELATIN COATED ORAL 3 TIMES DAILY
COMMUNITY

## 2018-10-15 RX ORDER — GABAPENTIN 100 MG/1
CAPSULE ORAL
Qty: 120 CAPSULE | Refills: 5 | OUTPATIENT
Start: 2018-10-15 | End: 2019-03-05

## 2019-03-05 ENCOUNTER — OFFICE VISIT (OUTPATIENT)
Dept: PAIN MEDICINE | Facility: CLINIC | Age: 69
End: 2019-03-05

## 2019-03-05 VITALS
DIASTOLIC BLOOD PRESSURE: 68 MMHG | HEIGHT: 67 IN | WEIGHT: 189 LBS | BODY MASS INDEX: 29.66 KG/M2 | SYSTOLIC BLOOD PRESSURE: 138 MMHG | OXYGEN SATURATION: 96 % | HEART RATE: 56 BPM

## 2019-03-05 DIAGNOSIS — M51.36 DDD (DEGENERATIVE DISC DISEASE), LUMBAR: ICD-10-CM

## 2019-03-05 DIAGNOSIS — M47.816 SPONDYLOSIS OF LUMBAR REGION WITHOUT MYELOPATHY OR RADICULOPATHY: ICD-10-CM

## 2019-03-05 DIAGNOSIS — M48.061 LUMBAR STENOSIS WITHOUT NEUROGENIC CLAUDICATION: ICD-10-CM

## 2019-03-05 DIAGNOSIS — M47.814 THORACIC SPONDYLOSIS WITHOUT MYELOPATHY: Primary | ICD-10-CM

## 2019-03-05 PROCEDURE — 99213 OFFICE O/P EST LOW 20 MIN: CPT | Performed by: PHYSICIAN ASSISTANT

## 2019-04-29 RX ORDER — GABAPENTIN 100 MG/1
100 CAPSULE ORAL 4 TIMES DAILY
Qty: 120 CAPSULE | Refills: 5 | OUTPATIENT
Start: 2019-04-29 | End: 2019-07-17 | Stop reason: SDUPTHER

## 2019-04-29 RX ORDER — GABAPENTIN 100 MG/1
CAPSULE ORAL
Qty: 120 CAPSULE | OUTPATIENT
Start: 2019-04-29

## 2019-07-17 RX ORDER — GABAPENTIN 100 MG/1
100 CAPSULE ORAL 4 TIMES DAILY
Qty: 360 CAPSULE | Refills: 1 | OUTPATIENT
Start: 2019-07-17 | End: 2020-03-17

## 2019-11-11 ENCOUNTER — HOSPITAL ENCOUNTER (EMERGENCY)
Facility: HOSPITAL | Age: 69
Discharge: HOME | End: 2019-11-11
Attending: EMERGENCY MEDICINE | Admitting: EMERGENCY MEDICINE

## 2019-11-11 ENCOUNTER — LAB (OUTPATIENT)
Dept: LAB | Facility: HOSPITAL | Age: 69
End: 2019-11-11

## 2019-11-11 ENCOUNTER — APPOINTMENT (OUTPATIENT)
Dept: CARDIOLOGY | Facility: HOSPITAL | Age: 69
End: 2019-11-11

## 2019-11-11 ENCOUNTER — TRANSCRIBE ORDERS (OUTPATIENT)
Dept: LAB | Facility: HOSPITAL | Age: 69
End: 2019-11-11

## 2019-11-11 ENCOUNTER — APPOINTMENT (OUTPATIENT)
Dept: GENERAL RADIOLOGY | Facility: HOSPITAL | Age: 69
End: 2019-11-11

## 2019-11-11 VITALS
BODY MASS INDEX: 25.01 KG/M2 | TEMPERATURE: 97.8 F | RESPIRATION RATE: 18 BRPM | HEART RATE: 54 BPM | SYSTOLIC BLOOD PRESSURE: 132 MMHG | OXYGEN SATURATION: 94 % | WEIGHT: 165 LBS | DIASTOLIC BLOOD PRESSURE: 61 MMHG | HEIGHT: 68 IN

## 2019-11-11 DIAGNOSIS — E05.00 BASEDOW'S DISEASE: Primary | ICD-10-CM

## 2019-11-11 DIAGNOSIS — S80.11XA CONTUSION OF RIGHT CALF, INITIAL ENCOUNTER: Primary | ICD-10-CM

## 2019-11-11 DIAGNOSIS — E05.90 PRETIBIAL MYXEDEMA: ICD-10-CM

## 2019-11-11 DIAGNOSIS — E05.00 BASEDOW'S DISEASE: ICD-10-CM

## 2019-11-11 LAB
ALBUMIN SERPL-MCNC: 4.2 G/DL (ref 3.5–5.2)
ALBUMIN/GLOB SERPL: 1.4 G/DL
ALP SERPL-CCNC: 89 U/L (ref 39–117)
ALT SERPL W P-5'-P-CCNC: 18 U/L (ref 1–41)
ANION GAP SERPL CALCULATED.3IONS-SCNC: 10 MMOL/L (ref 5–15)
AST SERPL-CCNC: 17 U/L (ref 1–40)
BH CV LOWER VASCULAR LEFT COMMON FEMORAL AUGMENT: NORMAL
BH CV LOWER VASCULAR LEFT COMMON FEMORAL COMPRESS: NORMAL
BH CV LOWER VASCULAR LEFT COMMON FEMORAL PHASIC: NORMAL
BH CV LOWER VASCULAR LEFT COMMON FEMORAL SPONT: NORMAL
BH CV LOWER VASCULAR RIGHT COMMON FEMORAL AUGMENT: NORMAL
BH CV LOWER VASCULAR RIGHT COMMON FEMORAL COMPRESS: NORMAL
BH CV LOWER VASCULAR RIGHT COMMON FEMORAL PHASIC: NORMAL
BH CV LOWER VASCULAR RIGHT COMMON FEMORAL SPONT: NORMAL
BH CV LOWER VASCULAR RIGHT DISTAL FEMORAL AUGMENT: NORMAL
BH CV LOWER VASCULAR RIGHT DISTAL FEMORAL COMPRESS: NORMAL
BH CV LOWER VASCULAR RIGHT GASTRONEMIUS COMPRESS: NORMAL
BH CV LOWER VASCULAR RIGHT GREATER SAPH AK COMPRESS: NORMAL
BH CV LOWER VASCULAR RIGHT GREATER SAPH BK COMPRESS: NORMAL
BH CV LOWER VASCULAR RIGHT LESSER SAPH COMPRESS: NORMAL
BH CV LOWER VASCULAR RIGHT MID FEMORAL AUGMENT: NORMAL
BH CV LOWER VASCULAR RIGHT MID FEMORAL COMPRESS: NORMAL
BH CV LOWER VASCULAR RIGHT MID FEMORAL PHASIC: NORMAL
BH CV LOWER VASCULAR RIGHT MID FEMORAL SPONT: NORMAL
BH CV LOWER VASCULAR RIGHT PERONEAL AUGMENT: NORMAL
BH CV LOWER VASCULAR RIGHT PERONEAL COMPRESS: NORMAL
BH CV LOWER VASCULAR RIGHT POPLITEAL AUGMENT: NORMAL
BH CV LOWER VASCULAR RIGHT POPLITEAL COMPRESS: NORMAL
BH CV LOWER VASCULAR RIGHT POPLITEAL PHASIC: NORMAL
BH CV LOWER VASCULAR RIGHT POPLITEAL SPONT: NORMAL
BH CV LOWER VASCULAR RIGHT POSTERIOR TIBIAL AUGMENT: NORMAL
BH CV LOWER VASCULAR RIGHT POSTERIOR TIBIAL COMPRESS: NORMAL
BH CV LOWER VASCULAR RIGHT PROFUNDA FEMORAL AUGMENT: NORMAL
BH CV LOWER VASCULAR RIGHT PROFUNDA FEMORAL COMPRESS: NORMAL
BH CV LOWER VASCULAR RIGHT PROXIMAL FEMORAL AUGMENT: NORMAL
BH CV LOWER VASCULAR RIGHT PROXIMAL FEMORAL COMPRESS: NORMAL
BH CV LOWER VASCULAR RIGHT SAPHENOFEMORAL JUNCTION AUGMENT: NORMAL
BH CV LOWER VASCULAR RIGHT SAPHENOFEMORAL JUNCTION COMPRESS: NORMAL
BH CV LOWER VASCULAR RIGHT SAPHENOFEMORAL JUNCTION PHASIC: NORMAL
BH CV LOWER VASCULAR RIGHT SAPHENOFEMORAL JUNCTION SPONT: NORMAL
BILIRUB SERPL-MCNC: 0.5 MG/DL (ref 0.2–1.2)
BUN BLD-MCNC: 9 MG/DL (ref 8–23)
BUN/CREAT SERPL: 11.8 (ref 7–25)
CALCIUM SPEC-SCNC: 9.6 MG/DL (ref 8.6–10.5)
CHLORIDE SERPL-SCNC: 100 MMOL/L (ref 98–107)
CO2 SERPL-SCNC: 33 MMOL/L (ref 22–29)
CREAT BLD-MCNC: 0.76 MG/DL (ref 0.76–1.27)
GFR SERPL CREATININE-BSD FRML MDRD: 102 ML/MIN/1.73
GLOBULIN UR ELPH-MCNC: 3.1 GM/DL
GLUCOSE BLD-MCNC: 99 MG/DL (ref 65–99)
POTASSIUM BLD-SCNC: 3.8 MMOL/L (ref 3.5–5.2)
PROT SERPL-MCNC: 7.3 G/DL (ref 6–8.5)
SODIUM BLD-SCNC: 143 MMOL/L (ref 136–145)
T3FREE SERPL-MCNC: 3.09 PG/ML (ref 2–4.4)
T4 FREE SERPL-MCNC: 1.07 NG/DL (ref 0.93–1.7)
T4 SERPL-MCNC: 6.26 MCG/DL (ref 4.5–11.7)
TSH SERPL DL<=0.05 MIU/L-ACNC: 5.33 UIU/ML (ref 0.27–4.2)

## 2019-11-11 PROCEDURE — 99282 EMERGENCY DEPT VISIT SF MDM: CPT

## 2019-11-11 PROCEDURE — 93971 EXTREMITY STUDY: CPT | Performed by: INTERNAL MEDICINE

## 2019-11-11 PROCEDURE — 84443 ASSAY THYROID STIM HORMONE: CPT

## 2019-11-11 PROCEDURE — 73590 X-RAY EXAM OF LOWER LEG: CPT

## 2019-11-11 PROCEDURE — 36415 COLL VENOUS BLD VENIPUNCTURE: CPT

## 2019-11-11 PROCEDURE — 80053 COMPREHEN METABOLIC PANEL: CPT

## 2019-11-11 PROCEDURE — 93971 EXTREMITY STUDY: CPT

## 2019-11-11 PROCEDURE — 84481 FREE ASSAY (FT-3): CPT

## 2019-11-11 PROCEDURE — 84479 ASSAY OF THYROID (T3 OR T4): CPT

## 2019-11-11 PROCEDURE — 84439 ASSAY OF FREE THYROXINE: CPT

## 2019-11-11 NOTE — ED PROVIDER NOTES
Subjective   Chirag Abdi is a 69 y.o male who presents to the ED with complaints of a leg injury. The patient reports he was kicked in the back of his right calf by his horse 1 week ago. He has been experiencing bruising and swelling around the area since the incident. No gait issues, chest pain, or shortness of breath. The patient has a past medical history of CAD and hypertension. He is currently on blood thinners. There are no other acute symptoms at this time.        Leg Injury   Location:  Right  Severity:  Moderate  Onset quality:  Sudden  Duration:  1 week  Timing:  Constant  Progression:  Worsening  Chronicity:  New  Context:  Kicked by horse  Associated symptoms: no chest pain and no shortness of breath        Review of Systems   Respiratory: Negative for shortness of breath.    Cardiovascular: Positive for leg swelling. Negative for chest pain.   Musculoskeletal: Negative for gait problem.   All other systems reviewed and are negative.      Past Medical History:   Diagnosis Date   • Chronic pain disorder    • Coronary artery disease    • Extremity pain    • Hypertension    • Joint pain    • Low back pain        No Known Allergies    Past Surgical History:   Procedure Laterality Date   • ANKLE SURGERY     • APPENDECTOMY     • HERNIA REPAIR         Family History   Problem Relation Age of Onset   • Stroke Sister        Social History     Socioeconomic History   • Marital status:      Spouse name: Not on file   • Number of children: Not on file   • Years of education: Not on file   • Highest education level: Not on file   Tobacco Use   • Smoking status: Current Every Day Smoker   • Smokeless tobacco: Former User     Types: Chew   Substance and Sexual Activity   • Alcohol use: Yes     Comment: Rarely consumes alcohol   • Drug use: Yes     Comment: Recreational drug use         Objective   Physical Exam   Constitutional: He is oriented to person, place, and time. He appears well-developed and  well-nourished. No distress.   HENT:   Head: Normocephalic and atraumatic.   Nose: Nose normal.   Eyes: Conjunctivae are normal. No scleral icterus.   Neck: Normal range of motion. Neck supple.   Musculoskeletal: Normal range of motion. He exhibits no edema.        Right knee: He exhibits normal range of motion.        Right ankle: He exhibits normal range of motion.        Right lower leg: He exhibits tenderness.        Right foot: There is no tenderness.   Bruising around the right heel. No foot tenderness. Normal ankle range of motion. Calf tenderness with no discoloration, bruising, or palpable cords. Normal range of motion of the knee.   Neurological: He is alert and oriented to person, place, and time.   Skin: Skin is warm and dry.   Psychiatric: He has a normal mood and affect. His behavior is normal.   Nursing note and vitals reviewed.      Procedures         ED Course     XR negative.  Duplex negative.  Patient stable on serial rechecks.  Discussed findings, concerns, plan of care, expected course, reasons to return and followup.  Provided the opportunity to ask questions.                  MDM  Number of Diagnoses or Management Options  Contusion of right calf, initial encounter:      Amount and/or Complexity of Data Reviewed  Tests in the radiology section of CPT®: reviewed and ordered        Final diagnoses:   Contusion of right calf, initial encounter       Documentation assistance provided by aurea Montgomery.  Information recorded by the scribe was done at my direction and has been verified and validated by me.     Eric Montgomery  11/11/19 0302       Eric Montgomery  11/11/19 [id number]       Brody Jorgensen MD  11/11/19 1756

## 2019-11-12 LAB — T3RU NFR SERPL: 25 % (ref 24–39)

## 2020-03-17 RX ORDER — GABAPENTIN 100 MG/1
CAPSULE ORAL
Qty: 360 CAPSULE | Refills: 1 | Status: SHIPPED | OUTPATIENT
Start: 2020-03-17 | End: 2020-03-25

## 2020-03-17 NOTE — TELEPHONE ENCOUNTER
KAITLIN: [id number]    07/22/2019 Gabapentin 100MG [phone] ODELL MURPHY [id number] [place] 1  11/29/2019 Gabapentin 100MG [phone] ODELL MURPHY    Information left on [facility] voicemail/prescriber line.     LVM for patient that he would need an appointment to continue refills.

## 2020-03-23 NOTE — PROGRESS NOTES
"CHIEF COMPLAINT: \"I am doing fairly well, I am having an increase in my back pain.\"    BRIEF HISTORY: Mr. Chirag Abdi is a 70 y.o. male, who returns to the clinic for follow-up evaluation.  Patient was last seen on March 5, 2019, by Benoit Goins PA-C, for medication refills and follow-up evaluation.  At the time of his follow-up visit he reported he did not need any medication refills and that he had stopped taking gabapentin, because he felt he did not need a medication at that time.  Since, the patient did begin taking gabapentin 100 mg 4 times a day again, because he did notice a difference in his pain levels, and he reports he does feel significant analgesic benefit from medication, but does report an increase in his overall pain today. He denies adverse effects. He would like to discuss options regarding an increase of his gabapentin.  He continues to take tizanidine 2 mg as needed for muscle spasms.  He states that he has been using CBD oil with good results as well.  He previously underwent on 05/23/2018, bilateral thoracic medial branch rhizotomies at bilateral T8, T9, and T10, and continues to experience some ongoing relief, but does feel his pain is returning.  Overall patient states that he is doing well, and voices no concerns.  He denies any significant changes in his medical history since he was last seen.  He has been participating in physical therapy in the most recent months.  Current pain level: 6/10  Pain level ranges from 4/10 to 7/10   Pain increases by trying to stand up straight.   Pain decreases with sitting, lying down and heat.   Patient denies pain, numbness or weakness in the lower extremities.   Patient denies any new bladder or bowel problems  Patient uses heat, tizanidine, and TENS unit.     Review of previous therapies and additional medical records:  Chirag Abdi has already failed the following measures, including:   Conservative measures: oral analgesics, " opioids, topical analgesics, massage, physical therapy, ice, heat and chiropractic therapy   Interventional: Thoracic RFTC 05/23/2018. Thoracic facet joint injections on 06/15/2016  Bilateral lumbar medial branch rhizotomies: RFTC on 12/02/2015 and 09/06/2017.    Surgical: No history of lumbar surgery  Chirag Abdi presents with significant comorbidities including nicotine addiction, not engaged in treatment, arrhythmia engaged in treatment on Eliquis.  In terms of current analgesics, Chirag Abdi takes: tizanidine, gabapentin, Voltaren gel  I have reviewed her Manny Report #[id number] consistent with medication reconciliation.    Global Pain Scale 10-15-  2018 03-05-  2019 03-25  2020        Pain 11 9 15        Feelings 0 0 0        Clinical outcomes 9 0 5        Activities 17 0 7        GPS Total: 37 9 27          Diagnostic Studies: There are no new studies available for review.  MRI Lumbar Spine w/o Contrast, 07/17/2015: L4-L5, moderate bilateral neuroforaminal stenosis. L5-S1, moderate to severe bilateral neuroforaminal stenosis. No subluxation or acute fractures. No abnormal cord signal noted.  X-ray Lumbar 5 views, 06/09/2015: no fracture identified. Mild hypertrophic changes of degenerative disc disease diffusely, alignment is normal.       Review of Systems   Musculoskeletal: Positive for arthralgias, back pain, gait problem and joint swelling.   All other systems reviewed and are negative.     The following portions of the patient's history were reviewed and updated as appropriate: problem list, past medical history, past surgery history, social history, family history, medications, and allergies     /60   Temp 98.3 °F (36.8 °C)   Wt 79.8 kg (176 lb)   BMI 26.76 kg/m²      Physical Exam:  Constitutional: Patient is oriented to person, place, and time.   Patient appears well-developed and well-nourished.   HEENT: Head: Normocephalic and atraumatic.   Eyes: Conjunctivae and lids are  normal.   Pupils: Equal, round, reactive to light.   Neck: Trachea normal. Neck supple. No JVD present.   Lymphatic: No cervical adenopathy  Pulmonary Respiratory effort: No increased work of breathing or signs of respiratory distress. Auscultation of lungs: Clear to auscultation.   Cardiovascular Auscultation of heart: Normal rate and rhythm, normal S1 and S2, no murmurs.   Peripheral vascular exam:  Femoral: right 2+, left 2+. Posterior tibialis: right 2+ and left 2+. Dorsalis pedis: right 2+ and left 2+.  No edema.   Abdomen: The abdomen was soft and nontender. Bowel sounds were normal.   Lymphatic: Right: No inguinal adenopathy present. Left: No inguinal adenopathy present.    Musculoskeletal   Gait and station: Gait evaluation demonstrated a normal gait    Thoracic spine: Thoracic facet joint loading maneuvers are mildly positive.   Lumbar spine:   Passive and active range of motion are limited secondary pain. Extension, and rotation of the lumbar spine did increase and reproduce pain. Lumbar facet joint loading maneuvers are positive.    test and Gaenslen's test are negative   Piriformis maneuvers are negative.    Palpation of the bilateral ischial tuberosities, unrevealing   Palpation of the bilateral greater trochanter, unrevealing   Examination of the Iliotibial band: unrevealing.    Hip joints: The range of motion of the hip joints is full and without pain   Neurological:   Patient is alert and oriented to person, place, and time.   Speech: speech is normal.   Cortical function: Normal mental status.   Cranial nerves: Cranial nerves 2-12 intact.   Reflex Scores:  Right brachioradialis: 2+  Left brachioradialis: 2+  Right biceps: 2+  Left biceps: 2+  Right triceps: 2+  Left triceps: 2+  Right patellar: 2+  Left patellar: 2+  Right Achilles: 2+  Left Achilles: 2+  Motor strength: 5/5  Motor Tone: normal tone.   Involuntary movements: none.   Superficial/Primitive Reflexes: primitive reflexes were  absent.   Right Garibay: absent  Left Garibay: absent  Right ankle clonus: absent  Left ankle clonus: absent   Babinsky: absent  Negative long tract signs. Straight leg raising test is negative. Femoral stretch sign is negative.   Sensation: No sensory loss. Sensory exam: intact to light touch, intact pain and temperature sensation, intact vibration sensation and normal proprioception.   Coordination: Normal finger to nose and heel to shin. Normal balance and  negative Romberg's sign   Skin and subcutaneous tissue: Skin is warm and intact. No rash noted. No cyanosis.   Psychiatric: Judgment and insight: Normal. Orientation to person, place and time: Normal. Recent and remote memory: Intact. Mood and affect: Normal.          ASSESSMENT:   1. Lumbar stenosis without neurogenic claudication    2. Spondylosis of lumbar region without myelopathy or radiculopathy    3. Thoracic spondylosis without myelopathy    4. DDD (degenerative disc disease), lumbar    5. History of atrial fibrillation    6. Current every day smoker           PLAN/MEDICAL DECISION MAKING: I had a lengthy conversation with Mr. Chirag Abdi, 70 y.o. male regarding his chronic pain condition and potential therapeutic options including risks, benefits, alternative therapies, to name a few. Patient has failed to obtain pain relief with conservative measures, as referenced above.  Patient has experienced significant analgesic benefit from rhizotomies, as referenced above. He is currently taking gabapentin 100 mg 4 times a day again, but does report that at times he has taken 2 tablets equaling 200 mg 4 times a day due to an increase in his pain.  He reports he does feel significant analgesic benefit from medication, but does report an increase in his overall pain today. He denies adverse effects. He would like to discuss options regarding an increase of his gabapentin.  At this time we will increase his gabapentin to 300 mg 4 times a day, he will  call me if he experiences any adverse effects pertaining to increased drowsiness, falls, or any other bothersome symptoms.  He will follow-up with me in 2 months to reassess.  At this time the patient would like to forego any further interventional procedures, and continue with medication and conservatively and readdress his pain at a later visit.  Given the current circumstances of the pandemic, at his next appointment we will address his back pain further, discuss if further imaging is warranted, and if any need for a surgical consultation should be obtained.  He is in agreement with plan.  I have reviewed all available patient's medical records as well as previous therapies as referenced above, and discussed the patient with Dr. Munroe.  Therefore, I have proposed the following plan:  1. Interventional pain management measures: None indicated at this time.  At this time the patient would like to forego any interventional procedures, and continue with medication and conservatively and readdress at a later date.  If pain recurs, patient could be scheduled for one set of diagnostic bilateral thoracic medial branch blocks at T8, T9, T10 for bilateral thoracic facet joints at T9-T10, T10-T11.If patient experiences more than 80% relief and functional improvement, then, will repeat thoracic medial branch rhizotomies   2. Diagnostics:  A. We have discussed obtaining a new MRI of the lumbar spine  3. Long-term rehabilitation efforts:  A. The patient does not have a history of falls. I did complete a risk assessment for falls. Fall precautions:   B. Patient will start a comprehensive physical therapy program for water therapy, upper body strengthening/posture correction, core strengthening, gait and balance training and neurodynamics once pain is under control when   C. Start an exercise program such as yoga and water therapy  D. Continue TENS unit  E. Contrast therapy: Apply ice-packs for 15-20 minutes, followed by  heating pads for 15-20 minutes to affected area   F. Patient has been screened for tobacco use: Current tobacco user. Smoking Cessation: I have advised the patient at length regarding the long-term deleterious effects although, patient has declined smoking cessation at this time.  4. Pharmacological measures, Reviewed and Discussed: Patient takes Voltaren gel  A. Take Tylenol 500 mg four times daily as needed for mild to moderate breakthrough pain  B. Continue tizanidine 2 mg 3 times daily as needed for muscle spasms.  C. Increase gabapentin to 300 mg 4 times daily.    D. Follow-up with me in 8 weeks to assess progress  5. The patient has been instructed to contact my office with any questions or difficulties. The patient understands the plan and agrees to proceed accordingly.    Patient Care Team:  René Turcios MD as PCP - General (Family Medicine)  Orlando Munroe MD as Consulting Physician (Pain Medicine)  Pierre Villarreal MD as Consulting Physician (Endocrinology)  Jabari Loo MD as Consulting Physician (Neurosurgery)     No orders of the defined types were placed in this encounter.        Future Appointments   Date Time Provider Department Center   5/26/2020 10:00 AM Annel Pearson APRN [facility] None         [facility]       EMR Dragon/Transcription disclaimer:  Much of this encounter note is an electronic transcription of spoken language to printed text. Electronic transcription of spoken language may permit erroneous, or at times, nonsensical words or phrases to be inadvertently transcribed. Although I have reviewed the note for such errors, some may still exist.

## 2020-03-25 ENCOUNTER — OFFICE VISIT (OUTPATIENT)
Dept: PAIN MEDICINE | Facility: CLINIC | Age: 70
End: 2020-03-25

## 2020-03-25 VITALS
SYSTOLIC BLOOD PRESSURE: 112 MMHG | WEIGHT: 176 LBS | DIASTOLIC BLOOD PRESSURE: 60 MMHG | TEMPERATURE: 98.3 F | BODY MASS INDEX: 26.76 KG/M2

## 2020-03-25 DIAGNOSIS — F17.200 CURRENT EVERY DAY SMOKER: ICD-10-CM

## 2020-03-25 DIAGNOSIS — M51.36 DDD (DEGENERATIVE DISC DISEASE), LUMBAR: ICD-10-CM

## 2020-03-25 DIAGNOSIS — M47.816 SPONDYLOSIS OF LUMBAR REGION WITHOUT MYELOPATHY OR RADICULOPATHY: ICD-10-CM

## 2020-03-25 DIAGNOSIS — M48.061 LUMBAR STENOSIS WITHOUT NEUROGENIC CLAUDICATION: ICD-10-CM

## 2020-03-25 DIAGNOSIS — Z86.79 HISTORY OF ATRIAL FIBRILLATION: ICD-10-CM

## 2020-03-25 DIAGNOSIS — M47.814 THORACIC SPONDYLOSIS WITHOUT MYELOPATHY: ICD-10-CM

## 2020-03-25 PROCEDURE — 99213 OFFICE O/P EST LOW 20 MIN: CPT | Performed by: NURSE PRACTITIONER

## 2020-03-25 RX ORDER — GABAPENTIN 300 MG/1
300 CAPSULE ORAL 4 TIMES DAILY
Qty: 120 CAPSULE | Refills: 1 | OUTPATIENT
Start: 2020-03-25 | End: 2020-05-26

## 2020-05-21 NOTE — PROGRESS NOTES
"Chief complaint: \"I am doing fairly well, I am still having an increase in my back pain. My left shoulder is bothering me.\"    History of present illness: Mr. Chirag Abdi is a 70 y.o. male, who returns to the clinic for follow-up evaluation.  Patient was last seen on March 25, 2020, by me for medication refills and follow-up evaluation.  At the time of his follow-up visit we increased his gabapentin to 300 mg 4 times daily. He denies adverse effects. Today he continues to report an increase in his overall pain since last visit. In addition, he continues to take tizanidine 2 mg as needed three times daily for muscle spasms.  He previously underwent on 05/23/2018, bilateral thoracic medial branch rhizotomies at bilateral T8, T9, and T10, and continues to experience some ongoing relief, but does feel his pain is returning.  In addition, he reports an increase in his left shoulder pain.  He reports many years ago he was in a horse accident and ever since he has had shoulder issues.  He denies any significant changes in his medical history since he was last seen.  He had previously been participating in physical therapy in the most recent months.  Pain History: Patient reports a longstanding history of pain.  Pain Description: constant pain with intermittent exacerbation, described as aching, stabbing and throbbing sensation.   Radiation of Pain: The lower back pain primarily does not radiate.   Pain intensity today: 6/10  Average pain intensity last week: 7/10  Pain intensity ranges from: 5/10 to 9/10  Aggravating factors:  Pain increases with standing.  Alleviating factors: Pain decreases with sitting, lying, heat and and forward flexion.   Associated Symptoms:   Patient denies  pain, numbness and weakness in the  upper or lower extremities.   Patient denies  any new bladder or bowel problems.   Patient denies  difficulties with his balance ore recent falls.     Review of previous therapies and additional " medical records:  Chirag Abdi has already failed the following measures, including:   Conservative measures: oral analgesics, opioids, topical analgesics, massage, physical therapy, ice, heat and chiropractic therapy   Interventional: Thoracic RFTC 05/23/2018. Thoracic facet joint injections on 06/15/2016  Bilateral lumbar medial branch rhizotomies: RFTC on 12/02/2015 and 09/06/2017.    Surgical: No history of lumbar surgery  Chirag Abdi presents with significant comorbidities including nicotine addiction, not engaged in treatment, arrhythmia engaged in treatment on Eliquis.  In terms of current analgesics, Chirag Abdi takes: tizanidine, gabapentin, Voltaren gel  I have reviewed her Manny Report #[id number] consistent with medication reconciliation.    Global Pain Scale 10-15-  2018 03-05-  2019 03-25  2020 05-26  2020       Pain 11 9 15 17       Feelings 0 0 0 0       Clinical outcomes 9 0 5 7       Activities 17 0 7 9       GPS Total: 37 9 27 33         Diagnostic Studies: There are no new studies available for review.  MRI Lumbar Spine w/o Contrast, 07/17/2015: L4-L5, moderate bilateral neuroforaminal stenosis. L5-S1, moderate to severe bilateral neuroforaminal stenosis. No subluxation or acute fractures. No abnormal cord signal noted.  X-ray Lumbar 5 views, 06/09/2015: no fracture identified. Mild hypertrophic changes of degenerative disc disease diffusely, alignment is normal.       Review of Systems   Musculoskeletal: Positive for arthralgias, back pain, gait problem, myalgias and neck pain.   Allergic/Immunologic: Positive for environmental allergies.   All other systems reviewed and are negative.     The following portions of the patient's history were reviewed and updated as appropriate: problem list, past medical history, past surgery history, social history, family history, medications, and allergies     There were no vitals taken for this visit.     Physical  Exam:  Constitutional: Patient is oriented to person, place, and time.   Patient appears well-developed and well-nourished.   HEENT: Head: Normocephalic and atraumatic.   Eyes: Conjunctivae and lids are normal.   Pupils: Equal, round, reactive to light.   Neck: Trachea normal. Neck supple. No JVD present.   Lymphatic: No cervical adenopathy  Pulmonary Respiratory effort: No increased work of breathing or signs of respiratory distress. Auscultation of lungs: Clear to auscultation.   Cardiovascular Auscultation of heart: Normal rate and rhythm, normal S1 and S2, no murmurs.   Peripheral vascular exam:  Femoral: right 2+, left 2+. Posterior tibialis: right 2+ and left 2+. Dorsalis pedis: right 2+ and left 2+.  No edema.   Abdomen: The abdomen was soft and nontender. Bowel sounds were normal.   Lymphatic: Right: No inguinal adenopathy present. Left: No inguinal adenopathy present.    Musculoskeletal   Gait and station: Gait evaluation demonstrated a normal gait    Thoracic spine: Thoracic facet joint loading maneuvers are mildly positive.   Lumbar spine: Passive and active range of motion are limited secondary pain. Extension,  rotation of the lumbar spine increased and reproduced pain. Lumbar facet joint loading maneuvers are positive.    test and Gaenslen's test are negative   Piriformis maneuvers are negative.    Palpation of the bilateral ischial tuberosities, unrevealing   Palpation of the bilateral greater trochanter, unrevealing   Examination of the Iliotibial band: unrevealing.    Hip joints: The range of motion of the hip joints is full and without pain   Neurological:   Patient is alert and oriented to person, place, and time.   Speech: speech is normal.   Cortical function: Normal mental status.   Cranial nerves: Cranial nerves 2-12 intact.   Reflex Scores:  Right brachioradialis: 2+  Left brachioradialis: 2+  Right biceps: 2+  Left biceps: 2+  Right triceps: 2+  Left triceps: 2+  Right patellar:  2+  Left patellar: 2+  Right Achilles: 2+  Left Achilles: 2+  Motor strength: 5/5  Motor Tone: normal tone.   Involuntary movements: none.   Superficial/Primitive Reflexes: primitive reflexes were absent.   Right Garibay: absent  Left Garibay: absent  Right ankle clonus: absent  Left ankle clonus: absent   Babinsky: absent  Negative long tract signs. Straight leg raising test is negative. Femoral stretch sign is negative.   Sensation: No sensory loss. Sensory exam: intact to light touch, intact pain and temperature sensation, intact vibration sensation and normal proprioception.   Coordination: Normal finger to nose and heel to shin. Normal balance and  negative Romberg's sign   Skin and subcutaneous tissue: Skin is warm and intact. No rash noted. No cyanosis.   Psychiatric: Judgment and insight: Normal. Orientation to person, place and time: Normal. Recent and remote memory: Intact. Mood and affect: Normal.          ASSESSMENT:   1. Thoracic spondylosis without myelopathy    2. Spondylosis of lumbar region without myelopathy or radiculopathy    3. Lumbar stenosis without neurogenic claudication    4. DDD (degenerative disc disease), lumbar    5. History of atrial fibrillation           PLAN/MEDICAL DECISION MAKING: I had a lengthy conversation with Mr. Chirag Abdi, 70 y.o. male regarding his chronic pain condition and potential therapeutic options including risks, benefits, alternative therapies, to name a few. Patient has failed to obtain pain relief with conservative measures, as referenced above.  Patient has experienced significant analgesic benefit from rhizotomies, as referenced above.   At this time the patient would like to forego any further interventional procedures, and continue with medication and conservatively.  As addressed at his previous visit due to an increase in his pain I have ordered a new MRI of the lumbar spine to address his progression of symptoms.  I have reviewed all available  patient's medical records as well as previous therapies as referenced above, and discussed the patient with Dr. Munroe.  Therefore, I have proposed the following plan:  1. Interventional pain management measures: None indicated at this time.  Determinate upon MRI findings I have discussed with him repeating rhizotomies versus lumbar epidural versus transforaminal epidurals.  2. Diagnostics:  A. MRI of the lumbar spine without contrast  B. Left shoulder x-rays  3. Long-term rehabilitation efforts:  A. The patient does not have a history of falls. I did complete a risk assessment for falls. Fall precautions:   B. Patient will start a comprehensive physical therapy program for water therapy, upper body strengthening/posture correction, core strengthening, gait and balance training and neurodynamics once pain is under control when   C. Start an exercise program such as yoga and water therapy  D. Continue TENS unit  E. Contrast therapy: Apply ice-packs for 15-20 minutes, followed by heating pads for 15-20 minutes to affected area   F. Patient has been screened for tobacco use: Current tobacco user. Smoking Cessation: I have advised the patient at length regarding the long-term deleterious effects although, patient has declined smoking cessation at this time.  4. Pharmacological measures, Reviewed and Discussed: Patient takes Voltaren gel  A. Take Tylenol 500 mg four times daily as needed for mild to moderate breakthrough pain  B. Continue tizanidine 2 mg 3 times daily as needed for muscle spasms.  C. Increase gabapentin to 600 mg 4 times daily #120, 1 refill.   5. The patient has been instructed to contact my office with any questions or difficulties. The patient understands the plan and agrees to proceed accordingly.    Patient Care Team:  René Turcios MD as PCP - General (Family Medicine)  Orlando Munroe MD as Consulting Physician (Pain Medicine)  Pierre Villarreal MD as Consulting Physician  (Endocrinology)  Jabari Loo MD as Consulting Physician (Neurosurgery)     No orders of the defined types were placed in this encounter.        Future Appointments   Date Time Provider Department Center   5/26/2020 10:00 AM Annel Pearson APRN [facility] None         [facility]       EMR Dragon/Transcription disclaimer:  Much of this encounter note is an electronic transcription of spoken language to printed text. Electronic transcription of spoken language may permit erroneous, or at times, nonsensical words or phrases to be inadvertently transcribed. Although I have reviewed the note for such errors, some may still exist.

## 2020-05-26 ENCOUNTER — OFFICE VISIT (OUTPATIENT)
Dept: PAIN MEDICINE | Facility: CLINIC | Age: 70
End: 2020-05-26

## 2020-05-26 VITALS
SYSTOLIC BLOOD PRESSURE: 128 MMHG | WEIGHT: 176.2 LBS | RESPIRATION RATE: 12 BRPM | BODY MASS INDEX: 26.79 KG/M2 | HEART RATE: 62 BPM | TEMPERATURE: 98.2 F | DIASTOLIC BLOOD PRESSURE: 52 MMHG | OXYGEN SATURATION: 96 %

## 2020-05-26 DIAGNOSIS — M51.36 DDD (DEGENERATIVE DISC DISEASE), LUMBAR: ICD-10-CM

## 2020-05-26 DIAGNOSIS — M47.814 THORACIC SPONDYLOSIS WITHOUT MYELOPATHY: ICD-10-CM

## 2020-05-26 DIAGNOSIS — M47.816 SPONDYLOSIS OF LUMBAR REGION WITHOUT MYELOPATHY OR RADICULOPATHY: Primary | ICD-10-CM

## 2020-05-26 DIAGNOSIS — M48.061 LUMBAR STENOSIS WITHOUT NEUROGENIC CLAUDICATION: ICD-10-CM

## 2020-05-26 DIAGNOSIS — M47.816 SPONDYLOSIS OF LUMBAR REGION WITHOUT MYELOPATHY OR RADICULOPATHY: ICD-10-CM

## 2020-05-26 DIAGNOSIS — M25.612 DECREASED RANGE OF MOTION OF LEFT SHOULDER: ICD-10-CM

## 2020-05-26 DIAGNOSIS — Z86.79 HISTORY OF ATRIAL FIBRILLATION: ICD-10-CM

## 2020-05-26 PROCEDURE — 99213 OFFICE O/P EST LOW 20 MIN: CPT | Performed by: NURSE PRACTITIONER

## 2020-05-26 RX ORDER — GABAPENTIN 600 MG/1
600 TABLET ORAL 4 TIMES DAILY
Qty: 120 TABLET | Refills: 1 | OUTPATIENT
Start: 2020-05-26 | End: 2021-02-25

## 2020-06-04 ENCOUNTER — TELEPHONE (OUTPATIENT)
Dept: PAIN MEDICINE | Facility: CLINIC | Age: 70
End: 2020-06-04

## 2020-06-04 NOTE — TELEPHONE ENCOUNTER
Patient called nurse line asking to add an elbow view to his scheduled MRI that was originally ordered by Dr. Munroe. Informed patient that we can not add elbow view to MRI and that he needed to contact PCP. He verbalized understanding and had no further questions.

## 2020-06-10 ENCOUNTER — HOSPITAL ENCOUNTER (OUTPATIENT)
Dept: MRI IMAGING | Facility: HOSPITAL | Age: 70
Discharge: HOME | End: 2020-06-10
Admitting: NURSE PRACTITIONER

## 2020-06-10 ENCOUNTER — HOSPITAL ENCOUNTER (OUTPATIENT)
Dept: GENERAL RADIOLOGY | Facility: HOSPITAL | Age: 70
Discharge: HOME | End: 2020-06-10

## 2020-06-10 DIAGNOSIS — M25.612 DECREASED RANGE OF MOTION OF LEFT SHOULDER: ICD-10-CM

## 2020-06-10 PROCEDURE — 73030 X-RAY EXAM OF SHOULDER: CPT

## 2020-06-10 PROCEDURE — 72148 MRI LUMBAR SPINE W/O DYE: CPT

## 2020-07-15 NOTE — TELEPHONE ENCOUNTER
Received refill request for Gabapentin 100 mg qid. Patient last seen 03/05/19. Manny report # [id number] scanned to [employer]. Rx called in to [facility]  
Is This A New Presentation, Or A Follow-Up?: Skin Lesions
How Severe Is Your Skin Lesion?: mild
Additional History: Pt requesting UBSE.

## 2021-02-25 DIAGNOSIS — M48.061 LUMBAR STENOSIS WITHOUT NEUROGENIC CLAUDICATION: ICD-10-CM

## 2021-02-25 DIAGNOSIS — M47.816 SPONDYLOSIS OF LUMBAR REGION WITHOUT MYELOPATHY OR RADICULOPATHY: ICD-10-CM

## 2021-02-25 RX ORDER — GABAPENTIN 300 MG/1
300 CAPSULE ORAL 3 TIMES DAILY
Qty: 90 CAPSULE | Refills: 3 | Status: SHIPPED | OUTPATIENT
Start: 2021-02-25 | End: 2021-06-25

## 2021-02-25 NOTE — TELEPHONE ENCOUNTER
Patient attempted to stop medication. Restarted at 300mg Three times per day. Patient is to take this and then follow up in four months by telephone to determine further titration.

## 2021-02-25 NOTE — TELEPHONE ENCOUNTER
Spoke with Thania (wife). Advised that we had sent in Gabapentin for 300mg TID and we would need to do a telephone visit. She would like him to call back for visit.

## 2021-06-25 DIAGNOSIS — M48.061 LUMBAR STENOSIS WITHOUT NEUROGENIC CLAUDICATION: ICD-10-CM

## 2021-06-25 DIAGNOSIS — M47.816 SPONDYLOSIS OF LUMBAR REGION WITHOUT MYELOPATHY OR RADICULOPATHY: ICD-10-CM

## 2021-06-25 RX ORDER — GABAPENTIN 300 MG/1
CAPSULE ORAL
Qty: 90 CAPSULE | Refills: 0 | Status: SHIPPED | OUTPATIENT
Start: 2021-06-25 | End: 2021-07-27

## 2021-07-26 DIAGNOSIS — M47.816 SPONDYLOSIS OF LUMBAR REGION WITHOUT MYELOPATHY OR RADICULOPATHY: ICD-10-CM

## 2021-07-26 DIAGNOSIS — M48.061 LUMBAR STENOSIS WITHOUT NEUROGENIC CLAUDICATION: ICD-10-CM

## 2021-07-27 RX ORDER — GABAPENTIN 300 MG/1
CAPSULE ORAL
Qty: 90 CAPSULE | Refills: 0 | Status: SHIPPED | OUTPATIENT
Start: 2021-07-27 | End: 2021-08-23

## 2021-08-23 ENCOUNTER — TELEPHONE (OUTPATIENT)
Dept: PAIN MEDICINE | Facility: CLINIC | Age: 71
End: 2021-08-23

## 2021-08-23 DIAGNOSIS — M47.816 SPONDYLOSIS OF LUMBAR REGION WITHOUT MYELOPATHY OR RADICULOPATHY: ICD-10-CM

## 2021-08-23 DIAGNOSIS — M48.061 LUMBAR STENOSIS WITHOUT NEUROGENIC CLAUDICATION: ICD-10-CM

## 2021-08-23 RX ORDER — GABAPENTIN 300 MG/1
CAPSULE ORAL
Qty: 90 CAPSULE | Refills: 0 | Status: SHIPPED | OUTPATIENT
Start: 2021-08-23 | End: 2021-09-24

## 2021-08-23 NOTE — TELEPHONE ENCOUNTER
Provider: EVELIA SRINIVASAN  Caller: BETSY CALDERON  Relationship to Patient: SELF  Pharmacy: [address] [place] [address]. PHONE: [phone] FAX: [phone]  Phone Number: [phone]  Reason for Call: SPOKE TO OSWALDO MUNROE, JUST SENDING REMINDER TO HAVE PATIENT SCHEDULED FOR TELEHEALTH VISIT INSTEAD OF 09/27/21 IN OFFICE VISIT. ALSO WANTING TO KNOW IF HE COULD BE SEEN SOONER, PATIENT STATES HE HAS LESS THAN A 3 DAY SUPPLY OF GABAPENTIN (NEURONTIN) 300 MG CAPSULE.

## 2021-09-23 NOTE — PROGRESS NOTES
"Patient presents during the COVID-19 pandemic/federally declared [facility] of public health emergency.  The service was conducted via telephone.  This consultation was provided with the use of a telephone, as patient is unable to attend an in-office appointment due to the COVID-19 crisis. Consent for assessment and treatment was provided by the patient.  Patient location: Home   Physician location: Office    Chief complaint: \"I am doing fairly well, I am still having an increase in my back pain..\"    History of present illness: Mr. Chirag Abdi is a 71 y.o. male, who for follow-up evaluation.  I last saw him on May 26, 2020 for follow-up evaluation. He has remained on gabapentin 300 mg 3 times daily. He denies adverse effects. In addition, he continues to take tizanidine 2 mg as needed three times daily for muscle spasms.  He denies any significant changes in his medical history since he was last seen. He reports overall he is doing well, but has continued to experience some diffuse lower back pain, otherwise he is happy with his current pain management regimen.  Pain History: Patient reports a longstanding history of pain.  Pain Description: constant pain with intermittent exacerbation, described as aching, stabbing and throbbing sensation.   Radiation of Pain: The lower back pain primarily does not radiate.   Pain intensity today: 3/10  Average pain intensity last week: 4/10  Pain intensity ranges from: 2/10 to 6/10  Aggravating factors:  Pain increases with standing.  Alleviating factors: Pain decreases with sitting, lying, heat and and forward flexion.   Associated Symptoms:   Patient denies  pain, numbness and weakness in the  upper or lower extremities.   Patient denies  any new bladder or bowel problems.   Patient denies  difficulties with his balance ore recent falls.     Review of previous therapies and additional medical records:  Chirag Abdi has already failed the following measures, " including:   Conservative measures: oral analgesics, opioids, topical analgesics, massage, physical therapy, ice, heat and chiropractic therapy   Interventional: Thoracic RFTC 05/23/2018. Thoracic facet joint injections on 06/15/2016  Bilateral lumbar medial branch rhizotomies: RFTC on 12/02/2015 and 09/06/2017.    Surgical: No history of lumbar surgery  Chirag Abdi presents with significant comorbidities including nicotine addiction, not engaged in treatment, arrhythmia engaged in treatment on Eliquis.  In terms of current analgesics, Chirag Abdi takes: tizanidine, gabapentin, Voltaren gel  I have reviewed her Manny Report #[id number] consistent with medication reconciliation.    Global Pain Scale 10-15-  2018 03-05-  2019 03-25  2020 05-26  2020       Pain 11 9 15 17       Feelings 0 0 0 0       Clinical outcomes 9 0 5 7       Activities 17 0 7 9       GPS Total: 37 9 27 33         Review of New Diagnostic Studies:  MRI of the lumbar spine without contrast 6/10/2020: I have reviewed the images. Lumbar vertebral body heights are well-maintained. There is moderate multilevel degenerative disc disease, posterior subluxation of L2 on L3 L3 on L4 and L4 and L5.  T12-L1: Normal  L1-L2: Disc bulge, facet arthritis, without spinal canal stenosis or neuroforaminal narrowing.  L3-L4: Facet arthritis, with mild spinal canal stenosis and mild neuroforaminal stenosis bilaterally.  L4-L5: Broad-based disc protrusion with facet arthritis contributing to mild spinal canal stenosis and bilateral neural foraminal stenosis  L5-S1: Broad-based disc bulge with advanced posterior facet hypertrophy with mild left neuroforaminal stenosis.    Diagnostic Studies:   MRI Lumbar Spine w/o Contrast, 07/17/2015: L4-L5, moderate bilateral neuroforaminal stenosis. L5-S1, moderate to severe bilateral neuroforaminal stenosis. No subluxation or acute fractures. No abnormal cord signal noted.  X-ray Lumbar 5 views, 06/09/2015:  no fracture identified. Mild hypertrophic changes of degenerative disc disease diffusely, alignment is normal.       Review of Systems   Musculoskeletal: Positive for arthralgias, back pain, gait problem, myalgias and neck pain.   Allergic/Immunologic: Positive for environmental allergies.   All other systems reviewed and are negative.     The following portions of the patient's history were reviewed and updated as appropriate: problem list, past medical history, past surgery history, social history, family history, medications, and allergies     There were no vitals taken for this visit.     Due to the constraints of the telemedicine format, no physical exam was performed    Physical Exam: (Previous PE from last office visit)  Constitutional: Patient is oriented to person, place, and time.   Patient appears well-developed and well-nourished.   HEENT: Head: Normocephalic and atraumatic.   Eyes: Conjunctivae and lids are normal.   Pupils: Equal, round, reactive to light.   Neck: Trachea normal. Neck supple. No JVD present.   Lymphatic: No cervical adenopathy  Pulmonary Respiratory effort: No increased work of breathing or signs of respiratory distress. Auscultation of lungs: Clear to auscultation.   Cardiovascular Auscultation of heart: Normal rate and rhythm, normal S1 and S2, no murmurs.   Peripheral vascular exam:  Femoral: right 2+, left 2+. Posterior tibialis: right 2+ and left 2+. Dorsalis pedis: right 2+ and left 2+.  No edema.   Abdomen: The abdomen was soft and nontender. Bowel sounds were normal.   Lymphatic: Right: No inguinal adenopathy present. Left: No inguinal adenopathy present.    Musculoskeletal   Gait and station: Gait evaluation demonstrated a normal gait    Thoracic spine: Thoracic facet joint loading maneuvers are mildly positive.   Lumbar spine: Passive and active range of motion are limited secondary pain. Extension,  rotation of the lumbar spine increased and reproduced pain. Lumbar facet  joint loading maneuvers are positive.    test and Gaenslen's test are negative   Piriformis maneuvers are negative.    Palpation of the bilateral ischial tuberosities, unrevealing   Palpation of the bilateral greater trochanter, unrevealing   Examination of the Iliotibial band: unrevealing.    Hip joints: The range of motion of the hip joints is full and without pain   Neurological:   Patient is alert and oriented to person, place, and time.   Speech: speech is normal.   Cortical function: Normal mental status.   Cranial nerves: Cranial nerves 2-12 intact.   Reflex Scores:  Right brachioradialis: 2+  Left brachioradialis: 2+  Right biceps: 2+  Left biceps: 2+  Right triceps: 2+  Left triceps: 2+  Right patellar: 2+  Left patellar: 2+  Right Achilles: 2+  Left Achilles: 2+  Motor strength: 5/5  Motor Tone: normal tone.   Involuntary movements: none.   Superficial/Primitive Reflexes: primitive reflexes were absent.   Right Garibay: absent  Left Garibay: absent  Right ankle clonus: absent  Left ankle clonus: absent   Babinsky: absent  Negative long tract signs. Straight leg raising test is negative. Femoral stretch sign is negative.   Sensation: No sensory loss. Sensory exam: intact to light touch, intact pain and temperature sensation, intact vibration sensation and normal proprioception.   Coordination: Normal finger to nose and heel to shin. Normal balance and  negative Romberg's sign   Skin and subcutaneous tissue: Skin is warm and intact. No rash noted. No cyanosis.   Psychiatric: Judgment and insight: Normal. Orientation to person, place and time: Normal. Recent and remote memory: Intact. Mood and affect: Normal.          ASSESSMENT:   1. Lumbar stenosis without neurogenic claudication    2. Spondylosis of lumbar region without myelopathy or radiculopathy    3. DDD (degenerative disc disease), lumbar    4. Thoracic spondylosis without myelopathy    5. History of atrial fibrillation    6. Current every day  smoker           PLAN/MEDICAL DECISION MAKING: I had a lengthy conversation with . Chirag Abdi, 71 y.o. male regarding his chronic pain condition and potential therapeutic options including risks, benefits, alternative therapies, to name a few. Patient has failed to obtain pain relief with conservative measures, as referenced above.  Patient has experienced significant analgesic benefit continue mention of medication therapy. I have reviewed all available patient's medical records as well as previous therapies as referenced above.  Therefore, I have proposed the following plan:  1. Follow-up in 6 months for medication refill or sooner if needed.  2. Long-term rehabilitation efforts:  A. The patient does not have a history of falls. I did complete a risk assessment for falls. Fall precautions:   B. Patient will start a comprehensive physical therapy program for water therapy, upper body strengthening/posture correction, core strengthening, gait and balance training and neurodynamics once pain is under control when   C. Start an exercise program such as yoga and water therapy  D. Continue TENS unit  E. Contrast therapy: Apply ice-packs for 15-20 minutes, followed by heating pads for 15-20 minutes to affected area   F. Patient has been screened for tobacco use: Current tobacco user. Smoking Cessation: I have advised the patient at length regarding the long-term deleterious effects although, patient has declined smoking cessation at this time.  3. Pharmacological measures, Reviewed and Discussed: Patient takes Voltaren gel  A. Take Tylenol 500 mg four times daily as needed for mild to moderate breakthrough pain  B. Continue tizanidine 2 mg 3 times daily as needed for muscle spasms.  C. Increase gabapentin to 300 mg 4 times daily #120, 1 refill.   4. The patient has been instructed to contact my office with any questions or difficulties. The patient understands the plan and agrees to proceed  accordingly.    Patient has been made aware that patient will continue to have access to telephone call, tele-health or e-visit, or in office visit if an emergent or urgent issue arises.     This visit was performed via Telehealth. Patient was advised to call us back or contact a primary care provider, Urgent Care or the Emergency Department if symptoms worsen or treatment provided does not resolve symptoms. Patient verbalizes understanding of medication dosage, comfort measures, instructions for treatment, and follow-up.    This visit has been scheduled as a phone visit to comply with patient safety concerns in accordance with CDC recommendations. Total time of discussion was 8 minutes.        Patient Care Team:  Suzi Hanson MD as PCP - General (Internal Medicine)  Orlando Munroe MD as Consulting Physician (Pain Medicine)  Pierre Villarreal MD as Consulting Physician (Endocrinology)  Jabari Loo MD as Consulting Physician (Neurosurgery)     No orders of the defined types were placed in this encounter.        Future Appointments   Date Time Provider Department Center   9/27/2021  8:30 AM Annel Pearson APRN [facility]         [facility]       EMR Dragon/Transcription disclaimer:  Much of this encounter note is an electronic transcription of spoken language to printed text. Electronic transcription of spoken language may permit erroneous, or at times, nonsensical words or phrases to be inadvertently transcribed. Although I have reviewed the note for such errors, some may still exist.

## 2021-09-24 DIAGNOSIS — M47.816 SPONDYLOSIS OF LUMBAR REGION WITHOUT MYELOPATHY OR RADICULOPATHY: ICD-10-CM

## 2021-09-24 DIAGNOSIS — M48.061 LUMBAR STENOSIS WITHOUT NEUROGENIC CLAUDICATION: ICD-10-CM

## 2021-09-24 RX ORDER — GABAPENTIN 300 MG/1
CAPSULE ORAL
Qty: 90 CAPSULE | Refills: 0 | Status: SHIPPED | OUTPATIENT
Start: 2021-09-24 | End: 2021-09-27

## 2021-09-27 ENCOUNTER — OFFICE VISIT (OUTPATIENT)
Dept: PAIN MEDICINE | Facility: CLINIC | Age: 71
End: 2021-09-27

## 2021-09-27 DIAGNOSIS — M47.814 THORACIC SPONDYLOSIS WITHOUT MYELOPATHY: ICD-10-CM

## 2021-09-27 DIAGNOSIS — M48.061 LUMBAR STENOSIS WITHOUT NEUROGENIC CLAUDICATION: ICD-10-CM

## 2021-09-27 DIAGNOSIS — M51.36 DDD (DEGENERATIVE DISC DISEASE), LUMBAR: ICD-10-CM

## 2021-09-27 DIAGNOSIS — F17.200 CURRENT EVERY DAY SMOKER: ICD-10-CM

## 2021-09-27 DIAGNOSIS — Z86.79 HISTORY OF ATRIAL FIBRILLATION: ICD-10-CM

## 2021-09-27 DIAGNOSIS — M47.816 SPONDYLOSIS OF LUMBAR REGION WITHOUT MYELOPATHY OR RADICULOPATHY: ICD-10-CM

## 2021-09-27 PROCEDURE — 99441 PR PHYS/QHP TELEPHONE EVALUATION 5-10 MIN: CPT | Performed by: NURSE PRACTITIONER

## 2021-09-27 RX ORDER — GABAPENTIN 300 MG/1
300 CAPSULE ORAL 4 TIMES DAILY
Qty: 120 CAPSULE | Refills: 3 | Status: SHIPPED | OUTPATIENT
Start: 2021-09-27 | End: 2022-04-13

## 2021-10-21 DIAGNOSIS — M48.061 LUMBAR STENOSIS WITHOUT NEUROGENIC CLAUDICATION: ICD-10-CM

## 2021-10-21 RX ORDER — GABAPENTIN 300 MG/1
CAPSULE ORAL
Qty: 90 CAPSULE | Refills: 0 | OUTPATIENT
Start: 2021-10-21

## 2021-10-21 NOTE — TELEPHONE ENCOUNTER
Spoke with [facility] pharmacy and advised them of the correct RX Amt Lili prescribed  which is gabapentin 300 mg 4 times a day with 3 refills. The pharmacy stated that they would update that RX in their system.

## 2022-03-21 ENCOUNTER — TELEPHONE (OUTPATIENT)
Dept: PAIN MEDICINE | Facility: CLINIC | Age: 72
End: 2022-03-21

## 2022-03-21 NOTE — TELEPHONE ENCOUNTER
LVM that provider will not be in office and appointment needs rescheduled.   Requested call back. Appointment canceled.

## 2022-03-28 ENCOUNTER — TELEPHONE (OUTPATIENT)
Dept: PAIN MEDICINE | Facility: CLINIC | Age: 72
End: 2022-03-28

## 2022-03-28 NOTE — TELEPHONE ENCOUNTER
Pt appeared in office today for an appointment that had been cancelled due to provider being out of office. I spoke with pt and advised that we had called several times, and left a message about r/s. Pt stated that he received 2 automated calls to confirm his appointment. Pt is okay with r/s, he is wanting to know if he can have a video/telephone visit instead of coming in office. I advised the pt that I would send Annel a message and give him a call. Pt understood.       Made telephone visit with pt   
Patient

## 2022-04-13 DIAGNOSIS — M48.061 LUMBAR STENOSIS WITHOUT NEUROGENIC CLAUDICATION: ICD-10-CM

## 2022-04-13 RX ORDER — GABAPENTIN 300 MG/1
CAPSULE ORAL
Qty: 120 CAPSULE | Refills: 5 | Status: SHIPPED | OUTPATIENT
Start: 2022-04-13 | End: 2022-05-12 | Stop reason: SDUPTHER

## 2022-05-12 ENCOUNTER — OFFICE VISIT (OUTPATIENT)
Dept: PAIN MEDICINE | Facility: CLINIC | Age: 72
End: 2022-05-12

## 2022-05-12 DIAGNOSIS — M47.816 SPONDYLOSIS OF LUMBAR REGION WITHOUT MYELOPATHY OR RADICULOPATHY: ICD-10-CM

## 2022-05-12 DIAGNOSIS — M51.36 DDD (DEGENERATIVE DISC DISEASE), LUMBAR: ICD-10-CM

## 2022-05-12 DIAGNOSIS — Z86.79 HISTORY OF ATRIAL FIBRILLATION: ICD-10-CM

## 2022-05-12 DIAGNOSIS — M48.061 LUMBAR STENOSIS WITHOUT NEUROGENIC CLAUDICATION: ICD-10-CM

## 2022-05-12 PROCEDURE — 99441 PR PHYS/QHP TELEPHONE EVALUATION 5-10 MIN: CPT | Performed by: NURSE PRACTITIONER

## 2022-05-12 RX ORDER — GABAPENTIN 300 MG/1
300 CAPSULE ORAL 4 TIMES DAILY
Qty: 120 CAPSULE | Refills: 5 | Status: SHIPPED | OUTPATIENT
Start: 2022-05-12

## 2022-05-12 NOTE — PROGRESS NOTES
"Telemedicine Provider: DUNG Dahl  Location of Provider: Office  Type of Service: Consult via telemedicine  Mode of transmission: Telephone  (patient's preference).  Basis for telemedicine: COVID-19 crisis  Any physical exam was performed by the patient.  All communications with the patient were documented in the patient's medical record per documentation standards.  You have chosen to receive care through a telephone visit. Do you consent to use a telephone visit for your medical care today? yes    Chief complaint: \"I am doing fairly well, I am still having an increase in my back pain.\"    History of present illness: Mr. Chirag Abdi is a 72 y.o. male, who for follow-up evaluation.  I last saw him on September 27, 2021 for follow-up evaluation. He has remained on gabapentin 300 mg 4 times daily. He denies adverse effects. In addition, he continues to take tizanidine 2 mg as needed three times daily for muscle spasms.  He denies any significant changes in his medical history since he was last seen. He reports overall he is doing well, but has continued to experience some diffuse lower back pain, otherwise he is happy with his current pain management regimen.  Pain History: Patient reports a longstanding history of pain.  Pain Description: constant pain with intermittent exacerbation, described as aching, stabbing and throbbing sensation.   Radiation of Pain: The lower back pain primarily does not radiate.   Pain intensity today: 3/10  Average pain intensity last week: 4/10  Pain intensity ranges from: 2/10 to 6/10  Aggravating factors:  Pain increases with standing.  Alleviating factors: Pain decreases with sitting, lying, heat and and forward flexion.   Associated Symptoms:   Patient denies pain, numbness and weakness in the  upper or lower extremities.   Patient denies any new bladder or bowel problems.   Patient denies difficulties with his balance ore recent falls.     Review of previous " therapies and additional medical records:  Chirag Abdi has already failed the following measures, including:   Conservative measures: oral analgesics, opioids, topical analgesics, massage, physical therapy, ice, heat and chiropractic therapy   Interventional: Thoracic RFTC 05/23/2018. Thoracic facet joint injections on 06/15/2016  Bilateral lumbar medial branch rhizotomies: RFTC on 12/02/2015 and 09/06/2017.    Surgical: No history of lumbar surgery  Chirag Abdi presents with significant comorbidities including nicotine addiction, not engaged in treatment, arrhythmia engaged in treatment on Eliquis.  In terms of current analgesics, Chirag Abdi takes: tizanidine, gabapentin, Voltaren gel  I have reviewed her Manny Report is consistent with medication reconciliation.    Global Pain Scale 10-15-  2018 03-05-  2019 03-25  2020 05-26  2020       Pain 11 9 15 17       Feelings 0 0 0 0       Clinical outcomes 9 0 5 7       Activities 17 0 7 9       GPS Total: 37 9 27 33         Review of Diagnostic Studies:  MRI of the lumbar spine without contrast 6/10/2020: I have reviewed the images. Lumbar vertebral body heights are well-maintained. There is moderate multilevel degenerative disc disease, posterior subluxation of L2 on L3 L3 on L4 and L4 and L5.  T12-L1: Normal  L1-L2: Disc bulge, facet arthritis, without spinal canal stenosis or neuroforaminal narrowing.  L3-L4: Facet arthritis, with mild spinal canal stenosis and mild neuroforaminal stenosis bilaterally.  L4-L5: Broad-based disc protrusion with facet arthritis contributing to mild spinal canal stenosis and bilateral neural foraminal stenosis  L5-S1: Broad-based disc bulge with advanced posterior facet hypertrophy with mild left neuroforaminal stenosis.  X-ray Lumbar 5 views, 06/09/2015: no fracture identified. Mild hypertrophic changes of degenerative disc disease diffusely, alignment is normal.       Review of Systems    Musculoskeletal: Positive for arthralgias, back pain, gait problem, myalgias and neck pain.   Allergic/Immunologic: Positive for environmental allergies.   All other systems reviewed and are negative.     The following portions of the patient's history were reviewed and updated as appropriate: problem list, past medical history, past surgery history, social history, family history, medications, and allergies     There were no vitals taken for this visit.     Due to the constraints of the telemedicine format, no physical exam was performed    Physical Exam: (Previous PE from last office visit)  Constitutional: Patient is oriented to person, place, and time.   Patient appears well-developed and well-nourished.   HEENT: Head: Normocephalic and atraumatic.   Eyes: Conjunctivae and lids are normal.   Pupils: Equal, round, reactive to light.   Neck: Trachea normal. Neck supple. No JVD present.   Lymphatic: No cervical adenopathy  Pulmonary Respiratory effort: No increased work of breathing or signs of respiratory distress. Auscultation of lungs: Clear to auscultation.   Cardiovascular Auscultation of heart: Normal rate and rhythm, normal S1 and S2, no murmurs.   Peripheral vascular exam:  Femoral: right 2+, left 2+. Posterior tibialis: right 2+ and left 2+. Dorsalis pedis: right 2+ and left 2+.  No edema.   Abdomen: The abdomen was soft and nontender. Bowel sounds were normal.   Lymphatic: Right: No inguinal adenopathy present. Left: No inguinal adenopathy present.    Musculoskeletal   Gait and station: Gait evaluation demonstrated a normal gait    Thoracic spine: Thoracic facet joint loading maneuvers are mildly positive.   Lumbar spine: Passive and active range of motion are limited secondary pain. Extension,  rotation of the lumbar spine increased and reproduced pain. Lumbar facet joint loading maneuvers are positive.    test and Gaenslen's test are negative   Piriformis maneuvers are negative.    Palpation of  the bilateral ischial tuberosities, unrevealing   Palpation of the bilateral greater trochanter, unrevealing   Examination of the Iliotibial band: unrevealing.    Hip joints: The range of motion of the hip joints is full and without pain   Neurological:   Patient is alert and oriented to person, place, and time.   Speech: speech is normal.   Cortical function: Normal mental status.   Cranial nerves: Cranial nerves 2-12 intact.   Reflex Scores:  Right brachioradialis: 2+  Left brachioradialis: 2+  Right biceps: 2+  Left biceps: 2+  Right triceps: 2+  Left triceps: 2+  Right patellar: 2+  Left patellar: 2+  Right Achilles: 2+  Left Achilles: 2+  Motor strength: 5/5  Motor Tone: normal tone.   Involuntary movements: none.   Superficial/Primitive Reflexes: primitive reflexes were absent.   Right Garibay: absent  Left Garibay: absent  Right ankle clonus: absent  Left ankle clonus: absent   Babinsky: absent  Negative long tract signs. Straight leg raising test is negative. Femoral stretch sign is negative.   Sensation: No sensory loss. Sensory exam: intact to light touch, intact pain and temperature sensation, intact vibration sensation and normal proprioception.   Coordination: Normal finger to nose and heel to shin. Normal balance and  negative Romberg's sign   Skin and subcutaneous tissue: Skin is warm and intact. No rash noted. No cyanosis.   Psychiatric: Judgment and insight: Normal. Orientation to person, place and time: Normal. Recent and remote memory: Intact. Mood and affect: Normal.          ASSESSMENT:   1. Lumbar stenosis without neurogenic claudication    2. Spondylosis of lumbar region without myelopathy or radiculopathy    3. DDD (degenerative disc disease), lumbar    4. History of atrial fibrillation           PLAN/MEDICAL DECISION MAKING: I had a lengthy conversation with Mr. Chirag Abdi, 72 y.o. male regarding his chronic pain condition and potential therapeutic options including risks,  benefits, alternative therapies, to name a few. Patient has failed to obtain pain relief with conservative measures, as referenced above.  Patient has experienced significant analgesic benefit continue mention of medication therapy. I have reviewed all available patient's medical records as well as previous therapies as referenced above.  Therefore, I have proposed the following plan:  1. Follow-up in 6 months for medication refill or sooner if needed.  2. Long-term rehabilitation efforts:  A. The patient does not have a history of falls. I did complete a risk assessment for falls. Fall precautions:   B. Patient will start a comprehensive physical therapy program for water therapy, upper body strengthening/posture correction, core strengthening, gait and balance training and neurodynamics once pain is under control    C. Start an exercise program such as yoga and water therapy  D. Continue TENS unit  E. Contrast therapy: Apply ice-packs for 15-20 minutes, followed by heating pads for 15-20 minutes to affected area   F. Patient has been screened for tobacco use: Current tobacco user. Smoking Cessation: I have advised the patient at length regarding the long-term deleterious effects although, patient has declined smoking cessation at this time.  3. Pharmacological measures, Reviewed and Discussed: Patient takes Voltaren gel  A. Take Tylenol 500 mg four times daily as needed for mild to moderate breakthrough pain  B. Continue tizanidine 2 mg 3 times daily as needed for muscle spasms.  C. Continue gabapentin to 300 mg 4 times daily #120, 5 refill.   4. The patient has been instructed to contact my office with any questions or difficulties. The patient understands the plan and agrees to proceed accordingly.    This visit has been scheduled as a phone visit to comply with patient safety concerns in accordance with CDC recommendations. Total time of discussion was 7 minutes.        Patient Care Team:  Suzi Hanson  MD Bonita as PCP - General (Internal Medicine)  Orlando Munroe MD as Consulting Physician (Pain Medicine)  Pierre Villarreal MD as Consulting Physician (Endocrinology)  Jabari Loo MD as Consulting Physician (Neurosurgery)     No orders of the defined types were placed in this encounter.        Future Appointments   Date Time Provider Department Center   5/12/2022  2:00 PM Annel Pearson APRN [facility]         DUNG Helm

## 2023-01-17 ENCOUNTER — TELEPHONE (OUTPATIENT)
Dept: PAIN MEDICINE | Facility: CLINIC | Age: 73
End: 2023-01-17
Payer: MEDICARE

## 2023-01-17 NOTE — TELEPHONE ENCOUNTER
Caller: BETSY CALDERON    Relationship to patient: SELF    Best call back number: [phone]    Patient is needing: PATIENT WANTED TO SPEAK TO EVELIA SRINIVASAN ABOUT A PAIN PUMP AND SPINE STIMULATOR THAT HE WAS RECOMMENDED.  PATIENT IS NOT INTERESTED IN THOSE.

## 2023-01-19 NOTE — TELEPHONE ENCOUNTER
PATIENT STATES HE SPOKE WITH DR. RIVAS BORJA, WANTING TO DISCUSS A SPINAL OR MEDITATION PUMP? WITH EVELIA SRINIVASAN.       CALL BACK #: [phone]

## 2023-03-23 ENCOUNTER — OFFICE VISIT (OUTPATIENT)
Dept: PAIN MEDICINE | Facility: CLINIC | Age: 73
End: 2023-03-23
Payer: MEDICARE

## 2023-03-23 DIAGNOSIS — M47.814 THORACIC SPONDYLOSIS WITHOUT MYELOPATHY: ICD-10-CM

## 2023-03-23 DIAGNOSIS — M51.36 DDD (DEGENERATIVE DISC DISEASE), LUMBAR: ICD-10-CM

## 2023-03-23 DIAGNOSIS — M48.061 LUMBAR STENOSIS WITHOUT NEUROGENIC CLAUDICATION: ICD-10-CM

## 2023-03-23 DIAGNOSIS — M47.816 SPONDYLOSIS OF LUMBAR REGION WITHOUT MYELOPATHY OR RADICULOPATHY: ICD-10-CM

## 2023-03-23 PROCEDURE — 99441 PR PHYS/QHP TELEPHONE EVALUATION 5-10 MIN: CPT | Performed by: NURSE PRACTITIONER

## 2023-03-23 PROCEDURE — 1160F RVW MEDS BY RX/DR IN RCRD: CPT | Performed by: NURSE PRACTITIONER

## 2023-03-23 PROCEDURE — 1125F AMNT PAIN NOTED PAIN PRSNT: CPT | Performed by: NURSE PRACTITIONER

## 2023-03-23 PROCEDURE — 1159F MED LIST DOCD IN RCRD: CPT | Performed by: NURSE PRACTITIONER

## 2023-03-23 NOTE — PROGRESS NOTES
"Telemedicine Provider: DUNG Dahl  Location of Provider: Office  Type of Service: Consult via telemedicine  Mode of transmission: Telephone  (patient's preference).  Basis for telemedicine: COVID-19 crisis  Any physical exam was performed by the patient.  All communications with the patient were documented in the patient's medical record per documentation standards.  You have chosen to receive care through a telephone visit. Do you consent to use a telephone visit for your medical care today? yes      Chief complaint: \"Lower back pain.\"    History of present illness: Mr. Chirag Abdi is a 73 y.o. male, who requests consultation today for follow-up evaluation.  I last saw him through telemedicine on 5/17/2022, for follow-up evaluation and medication refill.  He was on gabapentin 300 mg 4 times daily at that point, today he tells me he has tapered himself off of medication, due to ineffectiveness.  More recently, per patient he was evaluated by Dr. Hernan Valdes at [facility] orthopedics, and surgical intervention was discussed.  Although, patient is not quite interested in lumbar surgery at this time, he does tell me that Dr. Valdes discussed the possibility of a spinal cord stimulator or a intrathecal pain pump.  He tells me he is not interested in either advanced pain therapy.  He has been maintained on hydrocodone twice per day, through his PCP, which she tells me has provided him with significant improvement in his symptoms.  He denies any significant changes in his medical history since he was last seen.   Pain History: Patient reports a longstanding history of pain.  Pain Description: constant pain with intermittent exacerbation, described as aching, stabbing and throbbing sensation.   Radiation of Pain: The lower back pain primarily does not radiate.   Pain intensity today: 3/10  Average pain intensity last week: 4/10  Pain intensity ranges from: 2/10 to 6/10  Aggravating factors:  Pain increases " with standing.  Alleviating factors: Pain decreases with sitting, lying, heat and and forward flexion.   Associated Symptoms:   Patient denies pain, numbness and weakness in the  upper or lower extremities.   Patient denies any new bladder or bowel problems.   Patient denies difficulties with his balance ore recent falls.     Review of previous therapies and additional medical records:  Chirag Abdi has already failed the following measures, including:   Conservative measures: oral analgesics, opioids, topical analgesics, massage, physical therapy, ice, heat and chiropractic therapy   Interventional: Thoracic RFTC 05/23/2018. Thoracic facet joint injections on 06/15/2016  Bilateral lumbar medial branch rhizotomies: RFTC on 12/02/2015 and 09/06/2017.    Surgical: No history of lumbar surgery  Per patient he was evaluated by Dr. Hernan Valdes recently (records are not available for review) at [facility], and surgical intervention was discussed.  Although, patient is not quite interested in lumbar surgery at this time, he does tell me that Dr. Valdes discussed the possibility of a spinal cord stimulator or a intrathecal pain pump.  Chirag Abdi presents with significant comorbidities including nicotine addiction, not engaged in treatment, arrhythmia engaged in treatment on Eliquis.  In terms of current analgesics, Chirag Abdi takes: tizanidine, gabapentin, Voltaren gel  I have reviewed her Manny Report is consistent with medication reconciliation.    Global Pain Scale 10-15-  2018 03-05-  2019 03-25  2020 05-26  2020       Pain 11 9 15 17       Feelings 0 0 0 0       Clinical outcomes 9 0 5 7       Activities 17 0 7 9       GPS Total: 37 9 27 33         Review of Diagnostic Studies:  MRI of the lumbar spine without contrast 6/10/2020: I have reviewed the images. Lumbar vertebral body heights are well-maintained. There is moderate multilevel degenerative disc disease, posterior  subluxation of L2 on L3 L3 on L4 and L4 and L5.  T12-L1: Normal  L1-L2: Disc bulge, facet arthritis, without spinal canal stenosis or neuroforaminal narrowing.  L3-L4: Facet arthritis, with mild spinal canal stenosis and mild neuroforaminal stenosis bilaterally.  L4-L5: Broad-based disc protrusion with facet arthritis contributing to mild spinal canal stenosis and bilateral neural foraminal stenosis  L5-S1: Broad-based disc bulge with advanced posterior facet hypertrophy with mild left neuroforaminal stenosis.  X-ray Lumbar 5 views, 06/09/2015: no fracture identified. Mild hypertrophic changes of degenerative disc disease diffusely, alignment is normal.       Review of Systems   Musculoskeletal: Positive for arthralgias, back pain, gait problem, myalgias and neck pain.   Allergic/Immunologic: Positive for environmental allergies.   All other systems reviewed and are negative.     There were no vitals taken for this visit.     Due to the constraints of the telemedicine format, no physical exam was performed    Physical Exam: (Previous PE from last office visit)  Constitutional: Patient is oriented to person, place, and time.   Patient appears well-developed and well-nourished.   HEENT: Head: Normocephalic and atraumatic.   Eyes: Conjunctivae and lids are normal.   Pupils: Equal, round, reactive to light.   Neck: Trachea normal. Neck supple. No JVD present.   Lymphatic: No cervical adenopathy  Pulmonary Respiratory effort: No increased work of breathing or signs of respiratory distress. Auscultation of lungs: Clear to auscultation.   Cardiovascular Auscultation of heart: Normal rate and rhythm, normal S1 and S2, no murmurs.   Peripheral vascular exam:  Femoral: right 2+, left 2+. Posterior tibialis: right 2+ and left 2+. Dorsalis pedis: right 2+ and left 2+.  No edema.   Abdomen: The abdomen was soft and nontender. Bowel sounds were normal.   Lymphatic: Right: No inguinal adenopathy present. Left: No inguinal  adenopathy present.    Musculoskeletal   Gait and station: Gait evaluation demonstrated a normal gait    Thoracic spine: Thoracic facet joint loading maneuvers are mildly positive.   Lumbar spine: Passive and active range of motion are limited secondary pain. Extension,  rotation of the lumbar spine increased and reproduced pain. Lumbar facet joint loading maneuvers are positive.    test and Gaenslen's test are negative   Piriformis maneuvers are negative.    Palpation of the bilateral ischial tuberosities, unrevealing   Palpation of the bilateral greater trochanter, unrevealing   Examination of the Iliotibial band: unrevealing.    Hip joints: The range of motion of the hip joints is full and without pain   Neurological:   Patient is alert and oriented to person, place, and time.   Speech: speech is normal.   Cortical function: Normal mental status.   Cranial nerves: Cranial nerves 2-12 intact.   Reflex Scores:  Right brachioradialis: 2+  Left brachioradialis: 2+  Right biceps: 2+  Left biceps: 2+  Right triceps: 2+  Left triceps: 2+  Right patellar: 2+  Left patellar: 2+  Right Achilles: 2+  Left Achilles: 2+  Motor strength: 5/5  Motor Tone: normal tone.   Involuntary movements: none.   Superficial/Primitive Reflexes: primitive reflexes were absent.   Right Garibay: absent  Left Garibay: absent  Right ankle clonus: absent  Left ankle clonus: absent   Babinsky: absent  Negative long tract signs. Straight leg raising test is negative. Femoral stretch sign is negative.   Sensation: No sensory loss. Sensory exam: intact to light touch, intact pain and temperature sensation, intact vibration sensation and normal proprioception.   Coordination: Normal finger to nose and heel to shin. Normal balance and  negative Romberg's sign   Skin and subcutaneous tissue: Skin is warm and intact. No rash noted. No cyanosis.   Psychiatric: Judgment and insight: Normal. Orientation to person, place and time: Normal. Recent and  remote memory: Intact. Mood and affect: Normal.          ASSESSMENT:   1. Lumbar stenosis without neurogenic claudication    2. Spondylosis of lumbar region without myelopathy or radiculopathy    3. DDD (degenerative disc disease), lumbar    4. Thoracic spondylosis without myelopathy           PLAN/MEDICAL DECISION MAKING: I had a lengthy conversation with Mr. Chirag Abdi, 73 y.o. male regarding his chronic pain condition and potential therapeutic options including risks, benefits, alternative therapies, to name a few. He was on gabapentin 300 mg 4 times daily at that point, today he tells me he has tapered himself off of medication, due to ineffectiveness.  More recently, per patient he was evaluated by Dr. Hernan Valdes at [facility], and surgical intervention was discussed.  Although, patient is not quite interested in lumbar surgery at this time, he does tell me that Dr. Valdes discussed the possibility of a spinal cord stimulator or a intrathecal pain pump.  He tells me he is not interested in either advanced pain therapy.  He has been maintained on hydrocodone twice per day, through his PCP, which she tells me has provided him with significant improvement in his symptoms.  I did inform the patient, unfortunately we do not prescribe long-term pain medications for patients ordering medication management.  Patient has failed to obtain pain relief with conservative measures, as referenced above.  Patient has experienced significant analgesic benefit continue mention of medication therapy. I have reviewed all available patient's medical records as well as previous therapies as referenced above.  Therefore, I have proposed the following plan:  1. Pharmacological measures, Reviewed and Discussed: Patient takes hydrocodone  2.  Patient is currently being maintained on hydrocodone per his PCP, as we do not provide this type of medication management.  He is not interested in further advanced pain  therapies or injections.  Therefore, he does not require follow-up at this time.  If he would like to be evaluated in the future I will be happy to see him.  3. The patient has been instructed to contact my office with any questions or difficulties. The patient understands the plan and agrees to proceed accordingly.    This visit has been scheduled as a phone visit to comply with patient safety concerns in accordance with CDC recommendations. Total time of discussion was 10 minutes.        Patient Care Team:  Suzi Hanson MD as PCP - General (Internal Medicine)  Orlando Munroe MD as Consulting Physician (Pain Medicine)  Pierre Villarreal MD as Consulting Physician (Endocrinology)  Jabari Loo MD as Consulting Physician (Neurosurgery)  Annel Pearson APRN as Nurse Practitioner (Pain Medicine)     No orders of the defined types were placed in this encounter.        No future appointments.      DUNG Helm

## 2024-05-28 ENCOUNTER — TRANSCRIBE ORDERS (OUTPATIENT)
Dept: GENERAL RADIOLOGY | Facility: HOSPITAL | Age: 74
End: 2024-05-28
Payer: MEDICARE

## 2024-05-28 ENCOUNTER — HOSPITAL ENCOUNTER (OUTPATIENT)
Dept: GENERAL RADIOLOGY | Facility: HOSPITAL | Age: 74
Discharge: HOME | End: 2024-05-28
Admitting: INTERNAL MEDICINE
Payer: MEDICARE
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
## 2024-05-28 PROCEDURE — 73502 X-RAY EXAM HIP UNI 2-3 VIEWS: CPT

## 2024-05-28 PROCEDURE — 73110 X-RAY EXAM OF WRIST: CPT

## 2024-05-28 PROCEDURE — 73130 X-RAY EXAM OF HAND: CPT

## 2024-05-28 PROCEDURE — 73100 X-RAY EXAM OF WRIST: CPT

## 2025-02-14 PROBLEM — I34.0 MITRAL VALVE INSUFFICIENCY: Status: ACTIVE | Noted: 2025-02-14

## 2025-02-14 PROBLEM — I10 PRIMARY HYPERTENSION: Status: ACTIVE | Noted: 2025-02-14

## 2025-02-14 PROBLEM — E78.5 HYPERLIPIDEMIA: Status: ACTIVE | Noted: 2025-02-14

## 2025-02-14 PROBLEM — I48.0 PAROXYSMAL ATRIAL FIBRILLATION: Status: ACTIVE | Noted: 2025-02-14

## 2025-02-20 ENCOUNTER — TELEPHONE (OUTPATIENT)
Age: 75
End: 2025-02-20
Payer: MEDICARE

## 2025-02-20 NOTE — TELEPHONE ENCOUNTER
Pt called for refill on methlamazole I advised pt that we do not prescribe this medication he understands and will contact the prescribing provider.

## 2025-02-21 NOTE — ASSESSMENT & PLAN NOTE
Patient reports he does not check blood pressure at home.  Denies any headaches or dizziness.    Continue benazepril 20 mg daily, felodipine 10 mg daily    Orders:    Basic Metabolic Panel; Future    CBC (No Diff); Future

## 2025-02-21 NOTE — ASSESSMENT & PLAN NOTE
[id number] 3  Patient denies any recent palpitations or bleeding issues.  Continue Eliquis 5 mg twice daily, propranolol 10 mg twice daily

## 2025-02-21 NOTE — PROGRESS NOTES
Cardiology Patient Note     Name: Chirag Abdi  :   1950  PCP: Dahiana Cobos MD  Date:   2025  Department: [facility] CARDIOLOGY  [address] [place] [address]  formerly [facility] [id number]  Fax [phone]  Phone [phone]    Chief Complaint   Patient presents with    Atrial Fibrillation    Hypertension    Hyperlipidemia     Subjective     History of Present Illness  Chirag Abdi is a 75 y.o. male who presents today for routine 6-month follow-up.  Patient last seen in office 2024. Patient has a past medical history of paroxysmal A-fib, hyperlipidemia, hypertension.    Today, patient reports feeling good.  Denies any CV symptoms such as chest pain, chest pressure, shortness of breath, palpitations.  Reports doing his normal ADLs without any issues.  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  Patient also still reports smoking daily.    Records from [facility] EMR as below:  Medications: Atorvastatin 20 mg, benazepril 20 mg, Eliquis 5 mg, felodipine 10 mg, propranolol 10 mg twice daily  Recent labs 2024: LDL 59  MCT 10/25/2023: No arrhythmias, occasional PVC/PAC  Echo 10/23/2023: EF 67%, mild LVH, mild MR, trace TR  Carotid ultrasound 3/22/2021: Less than 50% stenosis bilaterally  SPECT 10/23/2023: SDS 0, EF 58%, no evidence of ischemia    Past Medical History:   Diagnosis Date    Aortic insufficiency     trace to mild by echo    Atrial fibrillation     Chronic anticoagulation     Chronic pain disorder     Coronary artery disease     Extremity pain     Heart valve regurgitation     Hypercholesterolemia     Hypertension     Hyperthyroidism     Hypokalemia     Joint pain     Kidney stone     Low back pain     Mitral regurgitation     Tricuspid regurgitation     mild by echo      Past Surgical History:   Procedure Laterality Date    ANKLE SURGERY      APPENDECTOMY      HERNIA REPAIR       Family  History   Problem Relation Age of Onset    Hypertension Mother     Sudden death Father     Stroke Sister     Hyperlipidemia Other      Social History     Socioeconomic History    Marital status:    Tobacco Use    Smoking status: Every Day     Current packs/day: 1.00     Types: Cigarettes    Smokeless tobacco: Former     Types: Chew   Vaping Use    Vaping status: Never Used   Substance and Sexual Activity    Alcohol use: Yes     Comment: Rarely consumes alcohol    Drug use: Yes     Types: Marijuana     Comment: Recreational drug use     No Known Allergies    Current Outpatient Medications:     apixaban (ELIQUIS) 5 MG tablet tablet, Take 1 tablet by mouth 2 (Two) Times a Day., Disp: , Rfl:     atorvastatin (LIPITOR) 40 MG tablet, Take 1 tablet by mouth Every Night. At bedtime, Disp: , Rfl:     benazepril (LOTENSIN) 20 MG tablet, Take 1 tablet by mouth Daily., Disp: , Rfl:     diclofenac (VOLTAREN) 1 % gel gel, PLEASE SEE ATTACHED FOR DETAILED DIRECTIONS, Disp: , Rfl:     felodipine (PLENDIL) 10 MG 24 hr tablet, Take 1 tablet by mouth Daily., Disp: , Rfl:     fluticasone (FLONASE) 50 MCG/ACT nasal spray, USE 2 SPRAYS IN EACH NOSTRIL ONCE DAILY, Disp: , Rfl: 0    HYDROcodone-acetaminophen (NORCO) 5-325 MG per tablet, Take 1 tablet by mouth 3 times a day., Disp: , Rfl:     methIMAzole (TAPAZOLE) 10 MG tablet, TAKE 1 TABLET EVERY DAY, Disp: , Rfl: 6    propranolol (INDERAL) 10 MG tablet, Take 1 tablet by mouth. 3 to 4 times daily, Disp: , Rfl:     TiZANidine (ZANAFLEX) 2 MG capsule, Take 1 capsule by mouth 3 (Three) Times a Day., Disp: , Rfl:     gabapentin (NEURONTIN) 300 MG capsule, Take 1 capsule by mouth 4 (Four) Times a Day. (Patient not taking: Reported on 2/24/2025), Disp: 120 capsule, Rfl: 5    Review of Systems   Constitutional:  Negative for fatigue.   Respiratory:  Negative for chest tightness and shortness of breath.    Cardiovascular:  Negative for chest pain, palpitations and leg swelling.   Skin:  "Negative.    Neurological: Negative.    Psychiatric/Behavioral: Negative.       Objective     Vital Signs:  /55 (BP Location: Right arm, Patient Position: Sitting)   Pulse 65   Ht 172.7 cm (68\")   Wt 73.5 kg (162 lb)   BMI 24.63 kg/m²   Estimated body mass index is 24.63 kg/m² as calculated from the following:    Height as of this encounter: 172.7 cm (68\").    Weight as of this encounter: 73.5 kg (162 lb).     Physical Exam  Vitals reviewed.   Constitutional:       Appearance: Normal appearance.   Eyes:      Extraocular Movements: Extraocular movements intact.      Pupils: Pupils are equal, round, and reactive to light.   Cardiovascular:      Rate and Rhythm: Normal rate and regular rhythm.   Pulmonary:      Effort: Pulmonary effort is normal.      Breath sounds: Normal breath sounds.   Abdominal:      General: Bowel sounds are normal.      Palpations: Abdomen is soft.   Musculoskeletal:         General: Normal range of motion.      Cervical back: Normal range of motion and neck supple.   Skin:     General: Skin is warm and dry.      Capillary Refill: Capillary refill takes less than 2 seconds.   Neurological:      General: No focal deficit present.      Mental Status: He is alert and oriented to person, place, and time.   Psychiatric:         Mood and Affect: Mood normal.         Behavior: Behavior normal.         Assessment and Plan     Assessment & Plan  Paroxysmal A-fib  [id number]  Patient denies any recent palpitations or bleeding issues.  Continue Eliquis 5 mg twice daily, propranolol 10 mg twice daily       Hyperlipidemia LDL goal <100  Recent labs 5/6/2024: LDL 59  Continue atorvastatin 20 mg daily    Orders:    Hepatic Function Panel; Future    Lipid Panel; Future    Primary hypertension  Patient reports he does not check blood pressure at home.  Denies any headaches or dizziness.    Continue benazepril 20 mg daily, felodipine 10 mg daily    Orders:    Basic Metabolic Panel; Future    CBC (No " Diff); Future    Mild mitral regurgitation  Echo 10/23/2023: EF 67%, mild LVH, mild MR, trace TR  Repeat echo in 3 years if patient remains asymptomatic         Follow Up  Return in about 6 months (around 8/24/2025).    Alycia Tran, APRN

## 2025-02-21 NOTE — ASSESSMENT & PLAN NOTE
Recent labs 5/6/2024: LDL 59  Continue atorvastatin 20 mg daily    Orders:    Hepatic Function Panel; Future    Lipid Panel; Future

## 2025-02-24 ENCOUNTER — OFFICE VISIT (OUTPATIENT)
Age: 75
End: 2025-02-24
Payer: MEDICARE

## 2025-02-24 VITALS
SYSTOLIC BLOOD PRESSURE: 140 MMHG | WEIGHT: 162 LBS | BODY MASS INDEX: 24.55 KG/M2 | HEIGHT: 68 IN | HEART RATE: 65 BPM | DIASTOLIC BLOOD PRESSURE: 55 MMHG

## 2025-02-24 DIAGNOSIS — I34.0 MILD MITRAL REGURGITATION: ICD-10-CM

## 2025-02-24 DIAGNOSIS — I48.0 PAROXYSMAL A-FIB: Primary | ICD-10-CM

## 2025-02-24 DIAGNOSIS — I10 PRIMARY HYPERTENSION: ICD-10-CM

## 2025-02-24 DIAGNOSIS — E78.5 HYPERLIPIDEMIA LDL GOAL <100: ICD-10-CM

## 2025-02-24 PROCEDURE — 99204 OFFICE O/P NEW MOD 45 MIN: CPT

## 2025-02-24 PROCEDURE — 1160F RVW MEDS BY RX/DR IN RCRD: CPT

## 2025-02-24 PROCEDURE — 1159F MED LIST DOCD IN RCRD: CPT

## 2025-02-24 PROCEDURE — 3077F SYST BP >= 140 MM HG: CPT

## 2025-02-24 PROCEDURE — 3078F DIAST BP <80 MM HG: CPT

## 2025-02-24 RX ORDER — HYDROCODONE BITARTRATE AND ACETAMINOPHEN 5; 325 MG/1; MG/1
1 TABLET ORAL 3 TIMES DAILY
COMMUNITY
Start: 2025-01-28

## 2025-04-22 RX ORDER — ATORVASTATIN CALCIUM 40 MG/1
40 TABLET, FILM COATED ORAL NIGHTLY
Qty: 90 TABLET | Refills: 1 | Status: SHIPPED | OUTPATIENT
Start: 2025-04-22 | End: 2025-04-28 | Stop reason: DRUGHIGH

## 2025-04-28 DIAGNOSIS — E78.5 HYPERLIPIDEMIA LDL GOAL <70: Primary | ICD-10-CM

## 2025-04-28 RX ORDER — ATORVASTATIN CALCIUM 20 MG/1
20 TABLET, FILM COATED ORAL DAILY
Qty: 90 TABLET | Refills: 1 | Status: SHIPPED | OUTPATIENT
Start: 2025-04-28

## 2025-04-28 NOTE — TELEPHONE ENCOUNTER
Rx Refill Note  Requested Prescriptions      No prescriptions requested or ordered in this encounter      Last office visit with prescribing clinician: Visit date not found   Last telemedicine visit with prescribing clinician: Visit date not found   Next office visit with prescribing clinician: 8/25/2025                        Pharmacy Info    Last Fill Date:  Rx Written Date:   Prescribed Qty:   Additional Details from Pharmacy:    Patient passed protocols per jenelle.         Nitza Quinones MA  04/28/25, 11:03 EDT

## 2025-06-03 DIAGNOSIS — I25.10 ASCVD (ARTERIOSCLEROTIC CARDIOVASCULAR DISEASE): Primary | ICD-10-CM

## 2025-06-03 RX ORDER — BENAZEPRIL HYDROCHLORIDE 20 MG/1
20 TABLET ORAL DAILY
Qty: 90 TABLET | Refills: 1 | Status: SHIPPED | OUTPATIENT
Start: 2025-06-03

## 2025-06-03 NOTE — TELEPHONE ENCOUNTER
Rx Refill Note  Requested Prescriptions     Signed Prescriptions Disp Refills    benazepril (LOTENSIN) 20 MG tablet 90 tablet 1     Sig: Take 1 tablet by mouth Daily.     Authorizing Provider: DAREN THOMAS     Ordering User: NITZA GUO      Last office visit with prescribing clinician: 8/13/24  Last telemedicine visit with prescribing clinician: Visit date not found   Next office visit with prescribing clinician: 8/25/2025                        Pharmacy Info    Last Fill Date:  Rx Written Date:   Prescribed Qty:   Additional Details from Pharmacy:    Patient passed protocols per [place].         Nitza Guo MA  06/03/25, 13:43 EDT

## 2025-07-22 ENCOUNTER — LAB (OUTPATIENT)
Facility: HOSPITAL | Age: 75
End: 2025-07-22
Payer: MEDICARE

## 2025-07-22 DIAGNOSIS — I10 PRIMARY HYPERTENSION: ICD-10-CM

## 2025-07-22 DIAGNOSIS — E78.5 HYPERLIPIDEMIA LDL GOAL <100: ICD-10-CM

## 2025-07-22 PROCEDURE — 80048 BASIC METABOLIC PNL TOTAL CA: CPT

## 2025-07-22 PROCEDURE — 80076 HEPATIC FUNCTION PANEL: CPT

## 2025-07-22 PROCEDURE — 80061 LIPID PANEL: CPT

## 2025-07-22 PROCEDURE — 85027 COMPLETE CBC AUTOMATED: CPT

## 2025-07-23 LAB
ALBUMIN SERPL-MCNC: 4.3 G/DL (ref 3.5–5.2)
ALP SERPL-CCNC: 76 U/L (ref 39–117)
ALT SERPL W P-5'-P-CCNC: 24 U/L (ref 1–41)
ANION GAP SERPL CALCULATED.3IONS-SCNC: 12.5 MMOL/L (ref 5–15)
AST SERPL-CCNC: 21 U/L (ref 1–40)
BILIRUB CONJ SERPL-MCNC: 0.2 MG/DL (ref 0–0.3)
BILIRUB INDIRECT SERPL-MCNC: 0.2 MG/DL
BILIRUB SERPL-MCNC: 0.4 MG/DL (ref 0–1.2)
BUN SERPL-MCNC: 9 MG/DL (ref 8–23)
BUN/CREAT SERPL: 13 (ref 7–25)
CALCIUM SPEC-SCNC: 9.2 MG/DL (ref 8.6–10.5)
CHLORIDE SERPL-SCNC: 102 MMOL/L (ref 98–107)
CHOLEST SERPL-MCNC: 118 MG/DL (ref 0–200)
CO2 SERPL-SCNC: 27.5 MMOL/L (ref 22–29)
CREAT SERPL-MCNC: 0.69 MG/DL (ref 0.76–1.27)
DEPRECATED RDW RBC AUTO: 43.5 FL (ref 37–54)
EGFRCR SERPLBLD CKD-EPI 2021: 97.1 ML/MIN/1.73
ERYTHROCYTE [DISTWIDTH] IN BLOOD BY AUTOMATED COUNT: 13 % (ref 12.3–15.4)
GLUCOSE SERPL-MCNC: 93 MG/DL (ref 65–99)
HCT VFR BLD AUTO: 43 % (ref 37.5–51)
HDLC SERPL-MCNC: 40 MG/DL (ref 40–60)
HGB BLD-MCNC: 14.8 G/DL (ref 13–17.7)
LDLC SERPL CALC-MCNC: 57 MG/DL (ref 0–100)
LDLC/HDLC SERPL: 1.37 {RATIO}
MCH RBC QN AUTO: 31.6 PG (ref 26.6–33)
MCHC RBC AUTO-ENTMCNC: 34.4 G/DL (ref 31.5–35.7)
MCV RBC AUTO: 91.7 FL (ref 79–97)
PLATELET # BLD AUTO: 258 10*3/MM3 (ref 140–450)
PMV BLD AUTO: 11 FL (ref 6–12)
POTASSIUM SERPL-SCNC: 3.4 MMOL/L (ref 3.5–5.2)
PROT SERPL-MCNC: 7 G/DL (ref 6–8.5)
RBC # BLD AUTO: 4.69 10*6/MM3 (ref 4.14–5.8)
SODIUM SERPL-SCNC: 142 MMOL/L (ref 136–145)
TRIGL SERPL-MCNC: 116 MG/DL (ref 0–150)
VLDLC SERPL-MCNC: 21 MG/DL (ref 5–40)
WBC NRBC COR # BLD AUTO: 10.91 10*3/MM3 (ref 3.4–10.8)

## 2025-07-29 NOTE — TELEPHONE ENCOUNTER
Caller: Chirag Abdi Kody    Relationship: Self    Best call back number: [phone]    Requested Prescriptions:   Requested Prescriptions     Pending Prescriptions Disp Refills    apixaban (ELIQUIS) 5 MG tablet tablet 60 tablet      Sig: Take 1 tablet by mouth.        Pharmacy where request should be sent: [facility] [address] [place] [address] [phone] [facility] [phone] FX     Last office visit with prescribing clinician: Visit date not found   Last telemedicine visit with prescribing clinician: Visit date not found   Next office visit with prescribing clinician: 8/25/2025     Additional details provided by patient: HAS BEEN WITHOUT MEDS FOR DAYS NOW     Does the patient have less than a 3 day supply:  [x] Yes  [] No    Would you like a call back once the refill request has been completed: [] Yes [x] No    If the office needs to give you a call back, can they leave a voicemail: [] Yes [x] No    Ros Bhat Rep   07/29/25 08:38 EDT

## 2025-07-29 NOTE — TELEPHONE ENCOUNTER
Rx Refill Note  Requested Prescriptions     Pending Prescriptions Disp Refills    apixaban (ELIQUIS) 5 MG tablet tablet 60 tablet      Sig: Take 1 tablet by mouth.      Last office visit with prescribing clinician: Visit date not found   Last telemedicine visit with prescribing clinician: Visit date not found   Next office visit with prescribing clinician: 8/25/2025                        Pharmacy Info    Last Fill Date:  Rx Written Date:   Prescribed Qty:   Additional Details from Pharmacy:    Patient passed protocols per jenelle.         Violeta Pagan MA  07/29/25, 09:25 EDT

## 2025-08-25 ENCOUNTER — OFFICE VISIT (OUTPATIENT)
Age: 75
End: 2025-08-25
Payer: MEDICARE

## 2025-08-25 VITALS
DIASTOLIC BLOOD PRESSURE: 63 MMHG | HEIGHT: 68 IN | SYSTOLIC BLOOD PRESSURE: 165 MMHG | WEIGHT: 147 LBS | HEART RATE: 60 BPM | BODY MASS INDEX: 22.28 KG/M2

## 2025-08-25 DIAGNOSIS — E78.5 HYPERLIPIDEMIA LDL GOAL <100: ICD-10-CM

## 2025-08-25 DIAGNOSIS — Z72.0 TOBACCO USE: ICD-10-CM

## 2025-08-25 DIAGNOSIS — I48.0 PAROXYSMAL ATRIAL FIBRILLATION: Primary | ICD-10-CM

## 2025-08-25 DIAGNOSIS — I10 PRIMARY HYPERTENSION: ICD-10-CM

## 2025-08-25 RX ORDER — HYDROXYZINE PAMOATE 25 MG/1
25 CAPSULE ORAL 3 TIMES DAILY PRN
COMMUNITY
Start: 2025-08-06 | End: 2025-08-25